# Patient Record
Sex: FEMALE | Race: WHITE | Employment: UNEMPLOYED | URBAN - METROPOLITAN AREA
[De-identification: names, ages, dates, MRNs, and addresses within clinical notes are randomized per-mention and may not be internally consistent; named-entity substitution may affect disease eponyms.]

---

## 2023-01-12 ENCOUNTER — OFFICE VISIT (OUTPATIENT)
Dept: URGENT CARE | Facility: CLINIC | Age: 53
End: 2023-01-12

## 2023-01-12 VITALS
SYSTOLIC BLOOD PRESSURE: 128 MMHG | HEART RATE: 71 BPM | TEMPERATURE: 98.7 F | BODY MASS INDEX: 34.49 KG/M2 | OXYGEN SATURATION: 98 % | WEIGHT: 207 LBS | RESPIRATION RATE: 18 BRPM | HEIGHT: 65 IN | DIASTOLIC BLOOD PRESSURE: 74 MMHG

## 2023-01-12 DIAGNOSIS — J02.9 SORE THROAT: ICD-10-CM

## 2023-01-12 DIAGNOSIS — J02.0 STREP PHARYNGITIS: Primary | ICD-10-CM

## 2023-01-12 LAB — S PYO AG THROAT QL: POSITIVE

## 2023-01-12 RX ORDER — AMOXICILLIN 875 MG/1
875 TABLET, COATED ORAL 2 TIMES DAILY
Qty: 14 TABLET | Refills: 0 | Status: SHIPPED | OUTPATIENT
Start: 2023-01-12 | End: 2023-01-19

## 2023-01-12 RX ORDER — DIPHENOXYLATE HYDROCHLORIDE AND ATROPINE SULFATE 2.5; .025 MG/1; MG/1
1 TABLET ORAL DAILY
COMMUNITY

## 2023-01-12 NOTE — PATIENT INSTRUCTIONS
Strep Throat   WHAT YOU NEED TO KNOW:   Strep throat is a throat infection caused by bacteria  It is easily spread from person to person  DISCHARGE INSTRUCTIONS:   Call 911 for any of the following: You have trouble breathing  Return to the emergency department if:   You have new symptoms like a bad headache, stiff neck, chest pain, or vomiting  You are drooling because you cannot swallow your spit  Contact your healthcare provider if:   You have a fever  You have a rash or ear pain  You have green, yellow-brown, or bloody mucus when you cough or blow your nose  You are unable to drink anything  You have questions or concerns about your condition or care  Medicines:   Antibiotics  help treat your strep throat  You should feel better within 2 to 3 days after you start antibiotics  Take your medicine as directed  Contact your healthcare provider if you think your medicine is not helping or if you have side effects  Tell him or her if you are allergic to any medicine  Keep a list of the medicines, vitamins, and herbs you take  Include the amounts, and when and why you take them  Bring the list or the pill bottles to follow-up visits  Carry your medicine list with you in case of an emergency  Manage your symptoms:   Use lozenges, ice, soft foods, or popsicles  to soothe your throat  Drink juice, milk shakes, or soup  if your throat is too sore to eat solid food  Drinking liquids can also help prevent dehydration  Gargle with salt water  Mix ¼ teaspoon salt in a glass of warm water and gargle  This may help reduce swelling in your throat  Do not smoke  Nicotine and other chemicals in cigarettes and cigars can cause lung damage and make your symptoms worse  Ask your healthcare provider for information if you currently smoke and need help to quit  E-cigarettes or smokeless tobacco still contain nicotine  Talk to your healthcare provider before you use these products      Return to work or school  24 hours after you start antibiotic medicine  Prevent the spread of strep throat:   Wash your hands often  Use soap and water  Wash your hands after you use the bathroom, change a child's diapers, or sneeze  Wash your hands before you prepare or eat food  Do not share food or drinks  Replace your toothbrush after you have taken antibiotics for 24 hours  Follow up with your doctor as directed:  Write down your questions so you remember to ask them during your visits  © Copyright [employer] 2022 Information is for End User's use only and may not be sold, redistributed or otherwise used for commercial purposes  All illustrations and images included in CareNotes® are the copyrighted property of A D [employer] M [employer] Inc  or [employer]   The above information is an  only  It is not intended as medical advice for individual conditions or treatments  Talk to your doctor, nurse or pharmacist before following any medical regimen to see if it is safe and effective for you  Acute Strep Pharyngitis:   -Strep is positive  Will treat with Amoxicillin 875mg taken as prescribed  Take with food and a probiotic    -Warm salt water gargles and tea with honey   -Stay very well hydrated and rest   -Advil or Tylenol for pain or fever  -Eat soft foods   -Cepacol throat lozenges for the pain   -Run a humidifier by your bed  Take steam showers     -If your sx worsen see your PCP immediately     *Change toothbrush after 24 hours after taking antibiotic

## 2023-01-12 NOTE — PROGRESS NOTES
[facility]        NAME: Maykel Orozco is a 46 y o  female  : 1970    MRN: [id number]  DATE: 2023  TIME: 9:02 AM    Assessment and Plan   Sore throat [J02 9]  1  Sore throat  POCT rapid strepA            Patient Instructions   Acute Strep Pharyngitis:   -Strep is positive  Will treat with Amoxicillin 875mg taken as prescribed  Take with food and a probiotic    -Warm salt water gargles and tea with honey   -Stay very well hydrated and rest   -Advil or Tylenol for pain or fever  -Eat soft foods   -Cepacol throat lozenges for the pain   -Run a humidifier by your bed  Take steam showers  -If your sx worsen see your PCP immediately       Follow up with PCP in 3-5 days  Proceed to  ER if symptoms worsen  Chief Complaint     Chief Complaint   Patient presents with   • Cold Like Symptoms     Pt here ill x  24 hours  pt states sore throat, tired,  no fever  Home Covid was neg, Pt is vax x2,   no flu  shot  Pt used Motrin  History of Present Illness       The patient is a 51-year-old female who presents today for a one day hx of sore throat, fatigue  She has tested negative for COVID  She is fully vaccinated  She has been taking Motrin  She has no drooling or stridor  No fever, chills, body aches  No dyspnea, wheezing, chest tightness, cp, palpitations  No dizziness or weakness  No GI sx  Good PO intake  No loss of taste or smell  No lower extremity edema  No hx of asthma or smoking  No known sick contacts or recent travel  Review of Systems   Review of Systems   Constitutional: Positive for fatigue  Negative for activity change, appetite change, chills, diaphoresis and fever  HENT: Positive for sore throat  Negative for congestion, ear discharge, ear pain, facial swelling, hearing loss, postnasal drip, rhinorrhea, sinus pressure, sinus pain, tinnitus, trouble swallowing and voice change  Eyes: Negative for visual disturbance     Respiratory: Negative for apnea, cough, chest tightness, shortness of breath, wheezing and stridor  Cardiovascular: Negative for chest pain, palpitations and leg swelling  Gastrointestinal: Negative for abdominal distention and abdominal pain  Genitourinary: Negative for decreased urine volume  Musculoskeletal: Negative for arthralgias, joint swelling, myalgias, neck pain and neck stiffness  Skin: Negative for rash  Allergic/Immunologic: Negative for immunocompromised state  Neurological: Negative for dizziness, weakness, light-headedness, numbness and headaches  Hematological: Negative for adenopathy  Current Medications       Current Outpatient Medications:   •  multivitamin (THERAGRAN) TABS, Take 1 tablet by mouth daily, Disp: , Rfl:     Current Allergies     Allergies as of 2023   • (No Known Allergies)            The following portions of the patient's history were reviewed and updated as appropriate: allergies, current medications, past family history, past medical history, past social history, past surgical history and problem list      Past Medical History:   Diagnosis Date   • Patient denies medical problems        Past Surgical History:   Procedure Laterality Date   • ADENOIDECTOMY     •  SECTION     • TONSILLECTOMY         Family History   Problem Relation Age of Onset   • Hypertension Mother    • Hyperlipidemia Father          Medications have been verified  Objective   /74   Pulse 71   Temp 98 7 °F (37 1 °C)   Resp 18   Ht 5' 5" (1 651 m)   Wt 93 9 kg (207 lb)   SpO2 98%   BMI 34 45 kg/m²   No LMP recorded  Physical Exam     Physical Exam  Vitals and nursing note reviewed  Constitutional:       General: She is not in acute distress  Appearance: She is well-developed  She is not diaphoretic  HENT:      Head: Normocephalic and atraumatic        Right Ear: Hearing, tympanic membrane, ear canal and external ear normal       Left Ear: Hearing, tympanic membrane, ear canal and external ear normal       Nose: Nose normal  No mucosal edema or rhinorrhea  Right Sinus: No maxillary sinus tenderness or frontal sinus tenderness  Left Sinus: No maxillary sinus tenderness or frontal sinus tenderness  Mouth/Throat:      Pharynx: Uvula midline  Pharyngeal swelling and posterior oropharyngeal erythema present  No oropharyngeal exudate or uvula swelling  Tonsils: No tonsillar exudate or tonsillar abscesses  2+ on the right  2+ on the left  Cardiovascular:      Rate and Rhythm: Normal rate and regular rhythm  Heart sounds: S1 normal and S2 normal  Heart sounds not distant  No murmur heard  No friction rub  No gallop  No S3 or S4 sounds  Pulmonary:      Effort: No tachypnea, bradypnea, accessory muscle usage or respiratory distress  Breath sounds: No decreased breath sounds, wheezing, rhonchi or rales  Musculoskeletal:      Cervical back: Normal range of motion and neck supple  Lymphadenopathy:      Cervical: Cervical adenopathy present  Right cervical: Superficial cervical adenopathy present  Left cervical: Superficial cervical adenopathy present  Neurological:      Mental Status: She is alert and oriented to person, place, and time     Psychiatric:         Behavior: Behavior normal

## 2023-01-19 ENCOUNTER — OFFICE VISIT (OUTPATIENT)
Dept: FAMILY MEDICINE CLINIC | Facility: CLINIC | Age: 53
End: 2023-01-19

## 2023-01-19 ENCOUNTER — TELEPHONE (OUTPATIENT)
Dept: OBGYN CLINIC | Facility: HOSPITAL | Age: 53
End: 2023-01-19

## 2023-01-19 VITALS
SYSTOLIC BLOOD PRESSURE: 120 MMHG | BODY MASS INDEX: 33.27 KG/M2 | HEART RATE: 75 BPM | HEIGHT: 66 IN | OXYGEN SATURATION: 98 % | TEMPERATURE: 97.7 F | RESPIRATION RATE: 16 BRPM | WEIGHT: 207 LBS | DIASTOLIC BLOOD PRESSURE: 80 MMHG

## 2023-01-19 DIAGNOSIS — Z13.220 SCREENING CHOLESTEROL LEVEL: ICD-10-CM

## 2023-01-19 DIAGNOSIS — G89.29 CHRONIC PAIN OF RIGHT WRIST: ICD-10-CM

## 2023-01-19 DIAGNOSIS — Z12.12 ENCOUNTER FOR COLORECTAL CANCER SCREENING: ICD-10-CM

## 2023-01-19 DIAGNOSIS — Z00.00 PHYSICAL EXAM: Primary | ICD-10-CM

## 2023-01-19 DIAGNOSIS — Z12.11 ENCOUNTER FOR COLORECTAL CANCER SCREENING: ICD-10-CM

## 2023-01-19 DIAGNOSIS — Z13.1 SCREENING FOR DIABETES MELLITUS: ICD-10-CM

## 2023-01-19 DIAGNOSIS — Z13.29 SCREENING FOR THYROID DISORDER: ICD-10-CM

## 2023-01-19 DIAGNOSIS — N92.6 MISSED PERIODS: ICD-10-CM

## 2023-01-19 DIAGNOSIS — Z12.31 ENCOUNTER FOR SCREENING MAMMOGRAM FOR BREAST CANCER: ICD-10-CM

## 2023-01-19 DIAGNOSIS — M25.531 CHRONIC PAIN OF RIGHT WRIST: ICD-10-CM

## 2023-01-19 DIAGNOSIS — Z23 ENCOUNTER FOR ADMINISTRATION OF VACCINE: ICD-10-CM

## 2023-01-19 DIAGNOSIS — Z13.0 SCREENING FOR DEFICIENCY ANEMIA: ICD-10-CM

## 2023-01-19 RX ORDER — CALCIUM CARBONATE 500(1250)
500 TABLET ORAL
COMMUNITY

## 2023-01-19 NOTE — TELEPHONE ENCOUNTER
Patient is being referred to a orthopedics  Please schedule accordingly      [facility]   [phone]

## 2023-01-19 NOTE — PROGRESS NOTES
[facility] PRACT[facility]    NAME: Teodora Recio  AGE: 46 y o  SEX: female  : 1970     DATE: 2023     Assessment and Plan:     Problem List Items Addressed This Visit    None  Visit Diagnoses     Physical exam    -  Primary    Encounter for screening mammogram for breast cancer        Relevant Orders    Mammo screening bilateral w 3d & cad    Encounter for colorectal cancer screening        Relevant Orders    Ambulatory referral for colonoscopy    Encounter for administration of vaccine        Relevant Orders    TDAP VACCINE GREATER THAN OR EQUAL TO 8YO IM (Completed)    influenza vaccine, quadrivalent, recombinant, PF, 0 5 mL, for patients 18 yr+ (FLUBLOK) (Completed)    Screening cholesterol level        Relevant Orders    Lipid panel    Screening for diabetes mellitus        Relevant Orders    Comprehensive metabolic panel    Screening for thyroid disorder        Relevant Orders    TSH, 3rd generation with Free T4 reflex    Screening for deficiency anemia        Relevant Orders    CBC and differential    Missed periods        Relevant Orders    FSH and LH    Chronic pain of right wrist        Relevant Orders    Ambulatory Referral to Orthopedic Surgery          Immunizations and preventive care screenings were discussed with patient today  Appropriate education was printed on patient's after visit summary  BMI Counseling: Body mass index is 33 92 kg/m²  The BMI is above normal  Nutrition recommendations include decreasing fast food intake and consuming healthier snacks  Exercise recommendations include exercising 3-5 times per week  Rationale for BMI follow-up plan is due to patient being overweight or obese  Depression Screening and Follow-up Plan: Patient was screened for depression during today's encounter  They screened negative with a PHQ-2 score of 0  No follow-ups on file       Chief Complaint:     Chief Complaint Patient presents with   • Physical Exam     Menopause    • Establish Care      History of Present Illness:     Adult Annual Physical   Patient here for a comprehensive physical exam     patient has been having pain around the wrist   Patient was signs of carpal tunnel  Has not been seen by an orthopedic surgery    Diet and Physical Activity  · Diet/Nutrition: well balanced diet  · Exercise: no formal exercise  Depression Screening  PHQ-2/9 Depression Screening    Little interest or pleasure in doing things: 0 - not at all  Feeling down, depressed, or hopeless: 0 - not at all  PHQ-2 Score: 0  PHQ-2 Interpretation: Negative depression screen       General Health  · Sleep: sleeps well  · Hearing: normal - bilateral   · Vision: no vision problems and wears glasses  · Dental: regular dental visits  /GYN Health  · Patient is: postmenopausal     Review of Systems:     Review of Systems   Constitutional: Negative for activity change, appetite change, chills, fatigue and fever  HENT: Positive for hearing loss  Negative for congestion  Respiratory: Negative for cough, chest tightness and shortness of breath  Cardiovascular: Negative for chest pain and leg swelling  Gastrointestinal: Negative for abdominal distention, abdominal pain, constipation, diarrhea, nausea and vomiting  All other systems reviewed and are negative       Past Medical History:     Past Medical History:   Diagnosis Date   • Patient denies medical problems       Past Surgical History:     Past Surgical History:   Procedure Laterality Date   • ADENOIDECTOMY     •  SECTION     • TONSILLECTOMY        Social History:     Social History     Socioeconomic History   • Marital status: Single     Spouse name: None   • Number of children: None   • Years of education: None   • Highest education level: None   Occupational History   • None   Tobacco Use   • Smoking status: Never   • Smokeless tobacco: Never   Vaping Use   • Vaping Use: Never used   Substance and Sexual Activity   • Alcohol use: Yes     Comment: socially   • Drug use: Never   • Sexual activity: None   Other Topics Concern   • None   Social History Narrative   • None     Social Determinants of Health     Financial Resource Strain: Not on file   Food Insecurity: Not on file   Transportation Needs: Not on file   Physical Activity: Not on file   Stress: Not on file   Social Connections: Not on file   Intimate Partner Violence: Not on file   Housing Stability: Not on file      Family History:     Family History   Problem Relation Age of Onset   • Hypertension Mother    • Heart disease Father    • Hyperlipidemia Father       Current Medications:     Current Outpatient Medications   Medication Sig Dispense Refill   • Calcium 500 MG tablet Take 500 mg by mouth     • multivitamin (THERAGRAN) TABS Take 1 tablet by mouth daily       No current facility-administered medications for this visit  Allergies:     No Known Allergies   Physical Exam:     /80 (BP Location: Left arm, Patient Position: Sitting, Cuff Size: Large)   Pulse 75   Temp 97 7 °F (36 5 °C)   Resp 16   Ht 5' 5 5" (1 664 m)   Wt 93 9 kg (207 lb)   SpO2 98%   BMI 33 92 kg/m²     Physical Exam  Vitals reviewed  Constitutional:       Appearance: Normal appearance  She is well-developed  HENT:      Head: Normocephalic and atraumatic  Right Ear: Tympanic membrane, ear canal and external ear normal  There is no impacted cerumen  Left Ear: Tympanic membrane, ear canal and external ear normal  There is no impacted cerumen  Nose: Nose normal       Mouth/Throat:      Mouth: Mucous membranes are moist       Pharynx: Oropharynx is clear  Eyes:      Conjunctiva/sclera: Conjunctivae normal       Pupils: Pupils are equal, round, and reactive to light  Cardiovascular:      Rate and Rhythm: Normal rate and regular rhythm  Heart sounds: Normal heart sounds     Pulmonary:      Effort: Pulmonary effort is normal       Breath sounds: Normal breath sounds  Abdominal:      General: Abdomen is flat  Bowel sounds are normal       Palpations: Abdomen is soft  Musculoskeletal:         General: Normal range of motion  Cervical back: Normal range of motion and neck supple  Skin:     General: Skin is warm  Capillary Refill: Capillary refill takes less than 2 seconds  Neurological:      General: No focal deficit present  Mental Status: She is alert and oriented to person, place, and time  Mental status is at baseline  Psychiatric:         Mood and Affect: Mood normal          Behavior: Behavior normal          Thought Content:  Thought content normal          Judgment: Judgment normal           Juanpablo Lilly MD  [address]

## 2023-01-28 ENCOUNTER — APPOINTMENT (OUTPATIENT)
Dept: LAB | Facility: HOSPITAL | Age: 53
End: 2023-01-28

## 2023-01-28 DIAGNOSIS — Z13.0 SCREENING FOR DEFICIENCY ANEMIA: ICD-10-CM

## 2023-01-28 DIAGNOSIS — Z13.29 SCREENING FOR THYROID DISORDER: ICD-10-CM

## 2023-01-28 DIAGNOSIS — Z13.220 SCREENING CHOLESTEROL LEVEL: ICD-10-CM

## 2023-01-28 DIAGNOSIS — Z13.1 SCREENING FOR DIABETES MELLITUS: ICD-10-CM

## 2023-01-28 DIAGNOSIS — N92.6 MISSED PERIODS: ICD-10-CM

## 2023-01-28 LAB
ALBUMIN SERPL BCP-MCNC: 3.7 G/DL (ref 3.5–5)
ALP SERPL-CCNC: 72 U/L (ref 46–116)
ALT SERPL W P-5'-P-CCNC: 24 U/L (ref 12–78)
ANION GAP SERPL CALCULATED.3IONS-SCNC: 8 MMOL/L (ref 4–13)
AST SERPL W P-5'-P-CCNC: 22 U/L (ref 5–45)
BASOPHILS # BLD AUTO: 0.05 THOUSANDS/ÂΜL (ref 0–0.1)
BASOPHILS NFR BLD AUTO: 1 % (ref 0–1)
BILIRUB SERPL-MCNC: 0.34 MG/DL (ref 0.2–1)
BUN SERPL-MCNC: 14 MG/DL (ref 5–25)
CALCIUM SERPL-MCNC: 9 MG/DL (ref 8.3–10.1)
CHLORIDE SERPL-SCNC: 105 MMOL/L (ref 96–108)
CHOLEST SERPL-MCNC: 223 MG/DL
CO2 SERPL-SCNC: 28 MMOL/L (ref 21–32)
CREAT SERPL-MCNC: 0.86 MG/DL (ref 0.6–1.3)
EOSINOPHIL # BLD AUTO: 0.58 THOUSAND/ÂΜL (ref 0–0.61)
EOSINOPHIL NFR BLD AUTO: 7 % (ref 0–6)
ERYTHROCYTE [DISTWIDTH] IN BLOOD BY AUTOMATED COUNT: 12.3 % (ref 11.6–15.1)
FSH SERPL-ACNC: 77.8 MIU/ML
GFR SERPL CREATININE-BSD FRML MDRD: 77 ML/MIN/1.73SQ M
GLUCOSE P FAST SERPL-MCNC: 106 MG/DL (ref 65–99)
HCT VFR BLD AUTO: 41.8 % (ref 34.8–46.1)
HDLC SERPL-MCNC: 62 MG/DL
HGB BLD-MCNC: 14 G/DL (ref 11.5–15.4)
IMM GRANULOCYTES # BLD AUTO: 0.02 THOUSAND/UL (ref 0–0.2)
IMM GRANULOCYTES NFR BLD AUTO: 0 % (ref 0–2)
LDLC SERPL CALC-MCNC: 148 MG/DL (ref 0–100)
LH SERPL-ACNC: 23.2 MIU/ML
LYMPHOCYTES # BLD AUTO: 2.34 THOUSANDS/ÂΜL (ref 0.6–4.47)
LYMPHOCYTES NFR BLD AUTO: 30 % (ref 14–44)
MCH RBC QN AUTO: 29.4 PG (ref 26.8–34.3)
MCHC RBC AUTO-ENTMCNC: 33.5 G/DL (ref 31.4–37.4)
MCV RBC AUTO: 88 FL (ref 82–98)
MONOCYTES # BLD AUTO: 0.45 THOUSAND/ÂΜL (ref 0.17–1.22)
MONOCYTES NFR BLD AUTO: 6 % (ref 4–12)
NEUTROPHILS # BLD AUTO: 4.45 THOUSANDS/ÂΜL (ref 1.85–7.62)
NEUTS SEG NFR BLD AUTO: 56 % (ref 43–75)
NONHDLC SERPL-MCNC: 161 MG/DL
NRBC BLD AUTO-RTO: 0 /100 WBCS
PLATELET # BLD AUTO: 250 THOUSANDS/UL (ref 149–390)
PMV BLD AUTO: 10.2 FL (ref 8.9–12.7)
POTASSIUM SERPL-SCNC: 4.4 MMOL/L (ref 3.5–5.3)
PROT SERPL-MCNC: 7.3 G/DL (ref 6.4–8.4)
RBC # BLD AUTO: 4.77 MILLION/UL (ref 3.81–5.12)
SODIUM SERPL-SCNC: 141 MMOL/L (ref 135–147)
TRIGL SERPL-MCNC: 65 MG/DL
TSH SERPL DL<=0.05 MIU/L-ACNC: 0.98 UIU/ML (ref 0.45–4.5)
WBC # BLD AUTO: 7.89 THOUSAND/UL (ref 4.31–10.16)

## 2023-02-02 DIAGNOSIS — E78.5 HYPERLIPIDEMIA, UNSPECIFIED HYPERLIPIDEMIA TYPE: Primary | ICD-10-CM

## 2023-02-02 DIAGNOSIS — R73.09 ABNORMAL GLUCOSE: ICD-10-CM

## 2023-03-01 ENCOUNTER — CONSULT (OUTPATIENT)
Dept: GASTROENTEROLOGY | Facility: CLINIC | Age: 53
End: 2023-03-01

## 2023-03-01 VITALS
HEIGHT: 66 IN | WEIGHT: 204.8 LBS | HEART RATE: 70 BPM | DIASTOLIC BLOOD PRESSURE: 98 MMHG | SYSTOLIC BLOOD PRESSURE: 149 MMHG | BODY MASS INDEX: 32.92 KG/M2

## 2023-03-01 DIAGNOSIS — K21.9 GASTROESOPHAGEAL REFLUX DISEASE, UNSPECIFIED WHETHER ESOPHAGITIS PRESENT: ICD-10-CM

## 2023-03-01 DIAGNOSIS — Z12.11 COLON CANCER SCREENING: ICD-10-CM

## 2023-03-01 DIAGNOSIS — K20.0 EOSINOPHILIC ESOPHAGITIS: Primary | ICD-10-CM

## 2023-03-01 RX ORDER — PANTOPRAZOLE SODIUM 40 MG/1
40 TABLET, DELAYED RELEASE ORAL DAILY
Qty: 90 TABLET | Refills: 1 | Status: SHIPPED | OUTPATIENT
Start: 2023-03-01

## 2023-03-01 RX ORDER — POLYETHYLENE GLYCOL 3350, SODIUM CHLORIDE, SODIUM BICARBONATE, POTASSIUM CHLORIDE 420; 11.2; 5.72; 1.48 G/4L; G/4L; G/4L; G/4L
4000 POWDER, FOR SOLUTION ORAL ONCE
Qty: 4000 ML | Refills: 0 | Status: SHIPPED | OUTPATIENT
Start: 2023-03-01 | End: 2023-03-01

## 2023-03-01 NOTE — PROGRESS NOTES
[facility] [address] [facility] [address] Consultation  Jimmy Rider 46 y o  female MRN: [id number]  Encounter: [id number]          ASSESSMENT AND PLAN:      1  Eosinophilic esophagitis  Diagnosed 10-15 years ago in [place] treated with PPI/Flovent inhaler, now just taking Pepcid AC as needed with heartburn twice a week and dysphagia once a month with steak  Otherwise reports she avoids lentils/chickpeas due to dysphagia with these foods initially  Also reports an allergy to strawberries with hives  -Start pantoprazole 40 mg daily half hour before breakfast  -Avoid food triggers  -EGD scheduled for further evaluation, can perform biopsies of the esophagus at that time  -     EGD; Future; Expected date: 03/01/2023  -     pantoprazole (PROTONIX) 40 mg tablet; Take 1 tablet (40 mg total) by mouth daily 1/2 hour before breakfast    2  Gastroesophageal reflux disease, unspecified whether esophagitis present  Currently taking Pepcid AC as needed for heartburn twice a week  She was on PPI in the past but stopped after 6 months after she was able to lose some weight, but has since gained some weight back after the holidays and moving from [place]  She notices heartburn mostly in the evening after dinner and when she goes out to eat late at night      -Avoid late night meals and laying down within 2 to 3 hours after eating  -Antireflux diet/lifestyle measures discussed  -Start pantoprazole 40 mg daily half hour before breakfast  -EGD scheduled as above  -Ideally patient would like to come off PPI in the future and remain only on Pepcid due to concern for side effects with PPI    -     EGD; Future; Expected date: 03/01/2023  -     pantoprazole (PROTONIX) 40 mg tablet; Take 1 tablet (40 mg total) by mouth daily 1/2 hour before breakfast    3  Colon cancer screening  Patient average risk for colon cancer due for colon cancer screening due to age  She denies any family history of colon cancer  Denies any change in bowel habit, constipation, diarrhea, blood in stool  She has occasional blood with wiping     -Colonoscopy scheduled with EGD  GoLytely prep and procedure explained  -Further recommendations pending colonoscopy  -     Colonoscopy; Future; Expected date: 03/01/2023  -     polyethylene glycol-electrolytes (TriLyte) 4000 mL solution; Take 4,000 mL by mouth once for 1 dose Take 4000 mL by mouth once for 1 dose  Use as directed        ______________________________________________________________________    HPI:  Aureliano Fernandes is a 46 y o  female with history of asthma, EoE, GERD who presents to establish care for EGD/Colonoscopy  She had a history of GERD and dysphagia and was diagnosed with EoE with EGD 10-15 years ago in [place] [address] with Alanna Wells and was originally treated with PPI/Flovent inhaler  She was on the steroid inhaler for a short course and PPI for about 6 months and then switched to [facility] as needed after she lost some weight  She moved from [facility] last April and has since gained some weight  Currently is having heartburn twice a week after dinner with occasional nocturnal awakenings after she goes out to eat  She used to have issues swallowing chickpeas and lentils, so avoids these things and now has noticed issues swallowing steak getting stuck in her esophagus  After this happens, she's not able to drink water to help it pass and she usually gives it time or has to regurgitate it  She's now being careful to chew/cut it up and that has helped  She gets hives with strawberries  She had been to an allergist prior to EoE diagnosis for issues with congestion in her ears and had allergy shots for a while  She normally has issues with dysphagia once a month  She's never had a colonoscopy  Denies any family history of colon cancer  Denies any issues with constipation/diarrhea  She gets occasional blood with wiping   Denies any nausea/vomiting, abdominal pain, black tarry stool, NSAID use  Drinks alcohol socially  Quit tobacco in her 35s after 15 years of smoking  Denies any drug use  REVIEW OF SYSTEMS:    CONSTITUTIONAL: Denies any fever, chills, rigors, and weight loss  HEENT: No earache or tinnitus  CARDIOVASCULAR: No chest pain or palpitations  RESPIRATORY: Denies any cough, hemoptysis, shortness of breath or dyspnea on exertion  GASTROINTESTINAL: As noted in the History of Present Illness  GENITOURINARY: Denies any hematuria or dysuria  NEUROLOGIC: No dizziness or vertigo  MUSCULOSKELETAL: Denies any joint swellings  SKIN: Denies skin rashes or itching  ENDOCRINE: Denies excessive thirst  Denies intolerance to heat or cold  PSYCHOSOCIAL: Denies depression or anxiety  Denies any recent memory loss  Historical Information   Past Medical History:   Diagnosis Date   • Patient denies medical problems      Past Surgical History:   Procedure Laterality Date   • ADENOIDECTOMY     •  SECTION     • TONSILLECTOMY       Social History   Social History     Substance and Sexual Activity   Alcohol Use Yes    Comment: socially     Social History     Substance and Sexual Activity   Drug Use Never     Social History     Tobacco Use   Smoking Status Never   Smokeless Tobacco Never     Family History   Problem Relation Age of Onset   • Hypertension Mother    • Heart disease Father    • Hyperlipidemia Father        Meds/Allergies       Current Outpatient Medications:   •  Calcium 500 MG tablet  •  multivitamin (THERAGRAN) TABS  •  pantoprazole (PROTONIX) 40 mg tablet  •  polyethylene glycol-electrolytes (TriLyte) 4000 mL solution    No Known Allergies        Objective     Blood pressure 149/98, pulse 70, height 5' 5 5" (1 664 m), weight 92 9 kg (204 lb 12 8 oz)  Body mass index is 33 56 kg/m²  PHYSICAL EXAM:      General Appearance:   Alert, cooperative, no distress   HEENT:   Normocephalic, atraumatic, anicteric       Neck:  Supple, symmetrical, trachea midline   Lungs:   Clear to auscultation bilaterally; no rales, rhonchi or wheezing; respirations unlabored    Heart[de-identified]   Regular rate and rhythm; no murmur  Abdomen:   Soft, non-tender, non-distended; normal bowel sounds; no masses, no organomegaly    Genitalia:   Deferred    Rectal:   Deferred    Extremities:  No cyanosis, clubbing or edema    Skin:  No jaundice, rashes, or lesions    Lymph nodes:  No palpable cervical lymphadenopathy        Lab Results:   No visits with results within 1 Day(s) from this visit     Latest known visit with results is:   Appointment on 01/28/2023   Component Date Value   • Cholesterol 01/28/2023 223 (H)    • Triglycerides 01/28/2023 65    • HDL, Direct 01/28/2023 62    • LDL Calculated 01/28/2023 148 (H)    • Non-HDL-Chol (CHOL-HDL) 01/28/2023 161    • Sodium 01/28/2023 141    • Potassium 01/28/2023 4 4    • Chloride 01/28/2023 105    • CO2 01/28/2023 28    • ANION GAP 01/28/2023 8    • BUN 01/28/2023 14    • Creatinine 01/28/2023 0 86    • Glucose, Fasting 01/28/2023 106 (H)    • Calcium 01/28/2023 9 0    • AST 01/28/2023 22    • ALT 01/28/2023 24    • Alkaline Phosphatase 01/28/2023 72    • Total Protein 01/28/2023 7 3    • Albumin 01/28/2023 3 7    • Total Bilirubin 01/28/2023 0 34    • eGFR 01/28/2023 77    • WBC 01/28/2023 7 89    • RBC 01/28/2023 4 77    • Hemoglobin 01/28/2023 14 0    • Hematocrit 01/28/2023 41 8    • MCV 01/28/2023 88    • MCH 01/28/2023 29 4    • MCHC 01/28/2023 33 5    • RDW 01/28/2023 12 3    • MPV 01/28/2023 10 2    • Platelets 16/17/5652 250    • nRBC 01/28/2023 0    • Neutrophils Relative 01/28/2023 56    • Immat GRANS % 01/28/2023 0    • Lymphocytes Relative 01/28/2023 30    • Monocytes Relative 01/28/2023 6    • Eosinophils Relative 01/28/2023 7 (H)    • Basophils Relative 01/28/2023 1    • Neutrophils Absolute 01/28/2023 4 45    • Immature Grans Absolute 01/28/2023 0 02    • Lymphocytes Absolute 01/28/2023 2 34    • Monocytes Absolute 01/28/2023 0 45    • Eosinophils Absolute 01/28/2023 0 58    • Basophils Absolute 01/28/2023 0 05    • TSH 3RD GENERATON 01/28/2023 0 982    • [facility] 01/28/2023 77 8    • LH 01/28/2023 23 2          Radiology Results:   No results found

## 2023-03-01 NOTE — H&P (VIEW-ONLY)
[facility] [address] Consultation  Arianna Graves 46 y o  female MRN: [id number]  Encounter: [id number]          ASSESSMENT AND PLAN:      1  Eosinophilic esophagitis  Diagnosed 10-15 years ago in [place] treated with PPI/Flovent inhaler, now just taking Pepcid AC as needed with heartburn twice a week and dysphagia once a month with steak  Otherwise reports she avoids lentils/chickpeas due to dysphagia with these foods initially  Also reports an allergy to strawberries with hives  -Start pantoprazole 40 mg daily half hour before breakfast  -Avoid food triggers  -EGD scheduled for further evaluation, can perform biopsies of the esophagus at that time  -     EGD; Future; Expected date: 03/01/2023  -     pantoprazole (PROTONIX) 40 mg tablet; Take 1 tablet (40 mg total) by mouth daily 1/2 hour before breakfast    2  Gastroesophageal reflux disease, unspecified whether esophagitis present  Currently taking Pepcid AC as needed for heartburn twice a week  She was on PPI in the past but stopped after 6 months after she was able to lose some weight, but has since gained some weight back after the holidays and moving from [place]  She notices heartburn mostly in the evening after dinner and when she goes out to eat late at night      -Avoid late night meals and laying down within 2 to 3 hours after eating  -Antireflux diet/lifestyle measures discussed  -Start pantoprazole 40 mg daily half hour before breakfast  -EGD scheduled as above  -Ideally patient would like to come off PPI in the future and remain only on Pepcid due to concern for side effects with PPI    -     EGD; Future; Expected date: 03/01/2023  -     pantoprazole (PROTONIX) 40 mg tablet; Take 1 tablet (40 mg total) by mouth daily 1/2 hour before breakfast    3  Colon cancer screening  Patient average risk for colon cancer due for colon cancer screening due to age  She denies any family history of colon cancer  Denies any change in bowel habit, constipation, diarrhea, blood in stool  She has occasional blood with wiping     -Colonoscopy scheduled with EGD  GoLytely prep and procedure explained  -Further recommendations pending colonoscopy  -     Colonoscopy; Future; Expected date: 03/01/2023  -     polyethylene glycol-electrolytes (TriLyte) 4000 mL solution; Take 4,000 mL by mouth once for 1 dose Take 4000 mL by mouth once for 1 dose  Use as directed        ______________________________________________________________________    HPI:  Tosha Dsouza is a 46 y o  female with history of asthma, EoE, GERD who presents to establish care for EGD/Colonoscopy  She had a history of GERD and dysphagia and was diagnosed with EoE with EGD 10-15 years ago in [place] [address] with Dayne Kirk and was originally treated with PPI/Flovent inhaler  She was on the steroid inhaler for a short course and PPI for about 6 months and then switched to [facility] as needed after she lost some weight  She moved from [facility] last April and has since gained some weight  Currently is having heartburn twice a week after dinner with occasional nocturnal awakenings after she goes out to eat  She used to have issues swallowing chickpeas and lentils, so avoids these things and now has noticed issues swallowing steak getting stuck in her esophagus  After this happens, she's not able to drink water to help it pass and she usually gives it time or has to regurgitate it  She's now being careful to chew/cut it up and that has helped  She gets hives with strawberries  She had been to an allergist prior to EoE diagnosis for issues with congestion in her ears and had allergy shots for a while  She normally has issues with dysphagia once a month  She's never had a colonoscopy  Denies any family history of colon cancer  Denies any issues with constipation/diarrhea  She gets occasional blood with wiping   Denies any nausea/vomiting, abdominal pain, black tarry stool, NSAID use  Drinks alcohol socially  Quit tobacco in her 35s after 15 years of smoking  Denies any drug use  REVIEW OF SYSTEMS:    CONSTITUTIONAL: Denies any fever, chills, rigors, and weight loss  HEENT: No earache or tinnitus  CARDIOVASCULAR: No chest pain or palpitations  RESPIRATORY: Denies any cough, hemoptysis, shortness of breath or dyspnea on exertion  GASTROINTESTINAL: As noted in the History of Present Illness  GENITOURINARY: Denies any hematuria or dysuria  NEUROLOGIC: No dizziness or vertigo  MUSCULOSKELETAL: Denies any joint swellings  SKIN: Denies skin rashes or itching  ENDOCRINE: Denies excessive thirst  Denies intolerance to heat or cold  PSYCHOSOCIAL: Denies depression or anxiety  Denies any recent memory loss  Historical Information   Past Medical History:   Diagnosis Date   • Patient denies medical problems      Past Surgical History:   Procedure Laterality Date   • ADENOIDECTOMY     •  SECTION     • TONSILLECTOMY       Social History   Social History     Substance and Sexual Activity   Alcohol Use Yes    Comment: socially     Social History     Substance and Sexual Activity   Drug Use Never     Social History     Tobacco Use   Smoking Status Never   Smokeless Tobacco Never     Family History   Problem Relation Age of Onset   • Hypertension Mother    • Heart disease Father    • Hyperlipidemia Father        Meds/Allergies       Current Outpatient Medications:   •  Calcium 500 MG tablet  •  multivitamin (THERAGRAN) TABS  •  pantoprazole (PROTONIX) 40 mg tablet  •  polyethylene glycol-electrolytes (TriLyte) 4000 mL solution    No Known Allergies        Objective     Blood pressure 149/98, pulse 70, height 5' 5 5" (1 664 m), weight 92 9 kg (204 lb 12 8 oz)  Body mass index is 33 56 kg/m²  PHYSICAL EXAM:      General Appearance:   Alert, cooperative, no distress   HEENT:   Normocephalic, atraumatic, anicteric       Neck:  Supple, symmetrical, trachea midline   Lungs:   Clear to auscultation bilaterally; no rales, rhonchi or wheezing; respirations unlabored    Heart[de-identified]   Regular rate and rhythm; no murmur  Abdomen:   Soft, non-tender, non-distended; normal bowel sounds; no masses, no organomegaly    Genitalia:   Deferred    Rectal:   Deferred    Extremities:  No cyanosis, clubbing or edema    Skin:  No jaundice, rashes, or lesions    Lymph nodes:  No palpable cervical lymphadenopathy        Lab Results:   No visits with results within 1 Day(s) from this visit     Latest known visit with results is:   Appointment on 01/28/2023   Component Date Value   • Cholesterol 01/28/2023 223 (H)    • Triglycerides 01/28/2023 65    • HDL, Direct 01/28/2023 62    • LDL Calculated 01/28/2023 148 (H)    • Non-HDL-Chol (CHOL-HDL) 01/28/2023 161    • Sodium 01/28/2023 141    • Potassium 01/28/2023 4 4    • Chloride 01/28/2023 105    • CO2 01/28/2023 28    • ANION GAP 01/28/2023 8    • BUN 01/28/2023 14    • Creatinine 01/28/2023 0 86    • Glucose, Fasting 01/28/2023 106 (H)    • Calcium 01/28/2023 9 0    • AST 01/28/2023 22    • ALT 01/28/2023 24    • Alkaline Phosphatase 01/28/2023 72    • Total Protein 01/28/2023 7 3    • Albumin 01/28/2023 3 7    • Total Bilirubin 01/28/2023 0 34    • eGFR 01/28/2023 77    • WBC 01/28/2023 7 89    • RBC 01/28/2023 4 77    • Hemoglobin 01/28/2023 14 0    • Hematocrit 01/28/2023 41 8    • MCV 01/28/2023 88    • MCH 01/28/2023 29 4    • MCHC 01/28/2023 33 5    • RDW 01/28/2023 12 3    • MPV 01/28/2023 10 2    • Platelets 60/21/5772 250    • nRBC 01/28/2023 0    • Neutrophils Relative 01/28/2023 56    • Immat GRANS % 01/28/2023 0    • Lymphocytes Relative 01/28/2023 30    • Monocytes Relative 01/28/2023 6    • Eosinophils Relative 01/28/2023 7 (H)    • Basophils Relative 01/28/2023 1    • Neutrophils Absolute 01/28/2023 4 45    • Immature Grans Absolute 01/28/2023 0 02    • Lymphocytes Absolute 01/28/2023 2 34    • Monocytes Absolute 01/28/2023 0 45    • Eosinophils Absolute 01/28/2023 0 58    • Basophils Absolute 01/28/2023 0 05    • TSH 3RD GENERATON 01/28/2023 0 982    • [facility] 01/28/2023 77 8    • LH 01/28/2023 23 2          Radiology Results:   No results found

## 2023-03-01 NOTE — PATIENT INSTRUCTIONS
GERD (Gastroesophageal Reflux Disease)   WHAT YOU NEED TO KNOW:   Gastroesophageal reflux disease (GERD) is reflux that happens more than 2 times a week for a few weeks  Reflux means acid and food in your stomach back up into your esophagus  GERD can cause other health problems over time if it is not treated  DISCHARGE INSTRUCTIONS:   Call your local emergency number (911 in the [address] [facility]) if:   You have severe chest pain and sudden trouble breathing  Return to the emergency department if:   You have trouble breathing after you vomit  You have trouble swallowing, or pain with swallowing  Your bowel movements are black, bloody, or tarry-looking  Your vomit looks like coffee grounds or has blood in it  Call your doctor or gastroenterologist if:   You feel full and cannot burp or vomit  You vomit large amounts, or you vomit often  You are losing weight without trying  Your symptoms get worse or do not improve with treatment  You have questions or concerns about your condition or care  Medicines:   Medicines  are used to decrease stomach acid  Medicine may also be used to help your lower esophageal sphincter and stomach contract (tighten) more  Take your medicine as directed  Contact your healthcare provider if you think your medicine is not helping or if you have side effects  Tell your provider if you are allergic to any medicine  Keep a list of the medicines, vitamins, and herbs you take  Include the amounts, and when and why you take them  Bring the list or the pill bottles to follow-up visits  Carry your medicine list with you in case of an emergency  Manage GERD:       Do not have foods or drinks that may increase heartburn  These include chocolate, peppermint, fried or fatty foods, drinks that contain caffeine, or carbonated drinks (soda)  Other foods include spicy foods, onions, tomatoes, and tomato-based foods   Do not have foods or drinks that can irritate your esophagus, such as citrus fruits, juices, and alcohol  Do not eat large meals  When you eat a lot of food at one time, your stomach needs more acid to digest it  Eat 6 small meals each day instead of 3 large ones, and eat slowly  Do not eat meals 2 to 3 hours before bedtime  Elevate the head of your bed  Place 6-inch blocks under the head of your bed frame  You may also use more than one pillow under your head and shoulders while you sleep  Maintain a healthy weight  If you are overweight, weight loss may help relieve symptoms of GERD  Do not smoke  Smoking weakens the lower esophageal sphincter and increases the risk of GERD  Ask your healthcare provider for information if you currently smoke and need help to quit  E-cigarettes or smokeless tobacco still contain nicotine  Talk to your healthcare provider before you use these products  Do not put pressure on your abdomen  Pressure pushes acid up into your esophagus  Do not wear clothing that is tight around your waist  Do not bend over  Bend at the knees if you need to pick something up  Follow up with your doctor or gastroenterologist as directed:  Write down your questions so you remember to ask them during your visits  © Copyright [employer] 2022 Information is for End User's use only and may not be sold, redistributed or otherwise used for commercial purposes  The above information is an  only  It is not intended as medical advice for individual conditions or treatments  Talk to your doctor, nurse or pharmacist before following any medical regimen to see if it is safe and effective for you

## 2023-03-08 ENCOUNTER — HOSPITAL ENCOUNTER (OUTPATIENT)
Dept: RADIOLOGY | Facility: HOSPITAL | Age: 53
Discharge: HOME | End: 2023-03-08

## 2023-03-08 VITALS — BODY MASS INDEX: 32.78 KG/M2 | HEIGHT: 66 IN | WEIGHT: 204 LBS

## 2023-03-08 DIAGNOSIS — Z12.31 ENCOUNTER FOR SCREENING MAMMOGRAM FOR BREAST CANCER: ICD-10-CM

## 2023-03-09 ENCOUNTER — TELEPHONE (OUTPATIENT)
Dept: GASTROENTEROLOGY | Facility: CLINIC | Age: 53
End: 2023-03-09

## 2023-03-09 NOTE — TELEPHONE ENCOUNTER
lmom confirming pt's colonoscopy/egd scheduled on 3/15/23 at [place] [address] with Dr Ana Sellers   Informed [place] [address] would be calling the day prior with the arrival time  Pt has instructions and did not have any questions  I did make her aware that the authorization is still pending and if any problems we would notify her

## 2023-03-14 RX ORDER — SODIUM CHLORIDE, SODIUM LACTATE, POTASSIUM CHLORIDE, CALCIUM CHLORIDE 600; 310; 30; 20 MG/100ML; MG/100ML; MG/100ML; MG/100ML
75 INJECTION, SOLUTION INTRAVENOUS CONTINUOUS
Status: CANCELLED | OUTPATIENT
Start: 2023-03-14

## 2023-03-15 ENCOUNTER — ANESTHESIA (OUTPATIENT)
Dept: GASTROENTEROLOGY | Facility: AMBULARY SURGERY CENTER | Age: 53
End: 2023-03-15

## 2023-03-15 ENCOUNTER — HOSPITAL ENCOUNTER (OUTPATIENT)
Dept: GASTROENTEROLOGY | Facility: AMBULARY SURGERY CENTER | Age: 53
Setting detail: OUTPATIENT SURGERY
Discharge: HOME | End: 2023-03-15
Attending: INTERNAL MEDICINE

## 2023-03-15 ENCOUNTER — ANESTHESIA EVENT (OUTPATIENT)
Dept: GASTROENTEROLOGY | Facility: AMBULARY SURGERY CENTER | Age: 53
End: 2023-03-15

## 2023-03-15 VITALS
RESPIRATION RATE: 18 BRPM | DIASTOLIC BLOOD PRESSURE: 57 MMHG | HEART RATE: 67 BPM | OXYGEN SATURATION: 98 % | SYSTOLIC BLOOD PRESSURE: 102 MMHG

## 2023-03-15 DIAGNOSIS — K21.9 GASTROESOPHAGEAL REFLUX DISEASE, UNSPECIFIED WHETHER ESOPHAGITIS PRESENT: ICD-10-CM

## 2023-03-15 DIAGNOSIS — Z12.11 COLON CANCER SCREENING: ICD-10-CM

## 2023-03-15 DIAGNOSIS — K20.0 EOSINOPHILIC ESOPHAGITIS: ICD-10-CM

## 2023-03-15 PROBLEM — E66.811 OBESITY (BMI 30.0-34.9): Status: ACTIVE | Noted: 2023-03-15

## 2023-03-15 PROBLEM — E66.9 OBESITY (BMI 30.0-34.9): Status: ACTIVE | Noted: 2023-03-15

## 2023-03-15 LAB
EXT PREGNANCY TEST URINE: NEGATIVE
EXT. CONTROL: NORMAL

## 2023-03-15 RX ORDER — PROPOFOL 10 MG/ML
INJECTION, EMULSION INTRAVENOUS AS NEEDED
Status: DISCONTINUED | OUTPATIENT
Start: 2023-03-15 | End: 2023-03-15

## 2023-03-15 RX ORDER — SODIUM CHLORIDE, SODIUM LACTATE, POTASSIUM CHLORIDE, CALCIUM CHLORIDE 600; 310; 30; 20 MG/100ML; MG/100ML; MG/100ML; MG/100ML
INJECTION, SOLUTION INTRAVENOUS CONTINUOUS PRN
Status: DISCONTINUED | OUTPATIENT
Start: 2023-03-15 | End: 2023-03-15

## 2023-03-15 RX ORDER — SODIUM CHLORIDE, SODIUM LACTATE, POTASSIUM CHLORIDE, CALCIUM CHLORIDE 600; 310; 30; 20 MG/100ML; MG/100ML; MG/100ML; MG/100ML
75 INJECTION, SOLUTION INTRAVENOUS CONTINUOUS
Status: DISCONTINUED | OUTPATIENT
Start: 2023-03-15 | End: 2023-03-19 | Stop reason: HOSPADM

## 2023-03-15 RX ORDER — PROPOFOL 10 MG/ML
INJECTION, EMULSION INTRAVENOUS CONTINUOUS PRN
Status: DISCONTINUED | OUTPATIENT
Start: 2023-03-15 | End: 2023-03-15

## 2023-03-15 RX ORDER — LIDOCAINE HYDROCHLORIDE 20 MG/ML
INJECTION, SOLUTION EPIDURAL; INFILTRATION; INTRACAUDAL; PERINEURAL AS NEEDED
Status: DISCONTINUED | OUTPATIENT
Start: 2023-03-15 | End: 2023-03-15

## 2023-03-15 RX ORDER — ONDANSETRON 2 MG/ML
INJECTION INTRAMUSCULAR; INTRAVENOUS AS NEEDED
Status: DISCONTINUED | OUTPATIENT
Start: 2023-03-15 | End: 2023-03-15

## 2023-03-15 RX ADMIN — SODIUM CHLORIDE, SODIUM LACTATE, POTASSIUM CHLORIDE, AND CALCIUM CHLORIDE: .6; .31; .03; .02 INJECTION, SOLUTION INTRAVENOUS at 10:11

## 2023-03-15 RX ADMIN — LIDOCAINE HYDROCHLORIDE 100 MG: 20 INJECTION, SOLUTION EPIDURAL; INFILTRATION; INTRACAUDAL; PERINEURAL at 10:46

## 2023-03-15 RX ADMIN — PROPOFOL 150 MG: 10 INJECTION, EMULSION INTRAVENOUS at 10:46

## 2023-03-15 RX ADMIN — ONDANSETRON 4 MG: 2 INJECTION INTRAMUSCULAR; INTRAVENOUS at 10:45

## 2023-03-15 RX ADMIN — PROPOFOL 130 MCG/KG/MIN: 10 INJECTION, EMULSION INTRAVENOUS at 10:46

## 2023-03-15 NOTE — ANESTHESIA POSTPROCEDURE EVALUATION
Post-Op Assessment Note    CV Status:  Stable    Pain management: adequate     Mental Status:  Alert and awake   Hydration Status:  Stable   PONV Controlled:  Controlled   Airway Patency:  Patent and adequate      Post Op Vitals Reviewed: Yes      Staff: CRNA         No notable events documented      BP      Temp      Pulse     Resp      SpO2

## 2023-03-15 NOTE — ANESTHESIA PREPROCEDURE EVALUATION
Procedure:  COLONOSCOPY  EGD    Relevant Problems   ANESTHESIA   (+) PONV (postoperative nausea and vomiting)      CARDIO (within normal limits)      ENDO (within normal limits)      GI/HEPATIC   (+) Gastroesophageal reflux disease      /RENAL (within normal limits)      GYN   (-) Currently pregnant      HEMATOLOGY (within normal limits)      MUSCULOSKELETAL (within normal limits)      NEURO/PSYCH (within normal limits)      PULMONARY (within normal limits)      Digestive   (+) Eosinophilic esophagitis      Other   (+) Obesity (BMI 30 0-34  9)             Anesthesia Plan  ASA Score- 2     Anesthesia Type- IV sedation with anesthesia with ASA Monitors  Additional Monitors:   Airway Plan:           Plan Factors-Exercise tolerance (METS): >4 METS  Chart reviewed  Imaging results reviewed  Existing labs reviewed  Patient summary reviewed  Patient is not a current smoker  Patient did not smoke on day of surgery  Induction- intravenous  Postoperative Plan-     Informed Consent- Anesthetic plan and risks discussed with patient  I personally reviewed this patient with the CRNA  Discussed and agreed on the Anesthesia Plan with the CRNA         NPO verified  NKDA  Urine pregnancy test negative today, 3/15/23  Plan:  IV sedation/MAC, GA as backup    Benefits and risks of sedation were discussed with the patient including possibility of recall under sedation and the potential for conversion to general anesthesia if necessary  All questions were answered  Anesthesia consent was obtained from the patient

## 2023-03-20 ENCOUNTER — OFFICE VISIT (OUTPATIENT)
Dept: OBGYN CLINIC | Facility: CLINIC | Age: 53
End: 2023-03-20

## 2023-03-20 ENCOUNTER — NURSE TRIAGE (OUTPATIENT)
Dept: OTHER | Facility: OTHER | Age: 53
End: 2023-03-20

## 2023-03-20 VITALS
SYSTOLIC BLOOD PRESSURE: 120 MMHG | TEMPERATURE: 97.9 F | BODY MASS INDEX: 32.47 KG/M2 | HEART RATE: 56 BPM | WEIGHT: 202 LBS | DIASTOLIC BLOOD PRESSURE: 82 MMHG | HEIGHT: 66 IN

## 2023-03-20 DIAGNOSIS — M77.8 RIGHT WRIST TENDONITIS: Primary | ICD-10-CM

## 2023-03-20 NOTE — PROGRESS NOTES
Chief Complaint     Right wrist pain       History of Present Illness     Cristina Rivero is a 46 y o  right hand dominant female presents for initial evaluation of her right wrist  She notes mild pain and tingling in the lateral aspect of her right wrist  She denies any injury or trauma, however her symptoms started around thanks giving  Pain is located along the dorsal ulnar aspect of the wrist and occurs with certain movements  The pain has been intermittent  Currently, she does not have pain  Past Medical History:   Diagnosis Date   • Patient denies medical problems    • PONV (postoperative nausea and vomiting)        Past Surgical History:   Procedure Laterality Date   • ADENOIDECTOMY     •  SECTION      x1   • TONSILLECTOMY         No Known Allergies    Current Outpatient Medications on File Prior to Visit   Medication Sig Dispense Refill   • Calcium 500 MG tablet Take 500 mg by mouth     • multivitamin (THERAGRAN) TABS Take 1 tablet by mouth daily     • pantoprazole (PROTONIX) 40 mg tablet Take 1 tablet (40 mg total) by mouth daily 1/2 hour before breakfast 90 tablet 1     No current facility-administered medications on file prior to visit  Social History     Tobacco Use   • Smoking status: Never   • Smokeless tobacco: Never   Vaping Use   • Vaping Use: Never used   Substance Use Topics   • Alcohol use: Yes     Comment: socially   • Drug use: Never       Family History   Problem Relation Age of Onset   • Breast cancer Mother 76   • Hypertension Mother    • Melanoma Mother 79   • Heart disease Father    • Hyperlipidemia Father    • Prostate cancer Father 72       Review of Systems     As stated in the HPI  All other systems were reviewed and are negative  Physical Exam     /82   Pulse 56   Temp 97 9 °F (36 6 °C)   Ht 5' 5 5" (1 664 m)   Wt 91 6 kg (202 lb)   BMI 33 10 kg/m²     GENERAL: This is a well-developed, well-nourished, age-appropriate patient in no acute distress  The patient is alert and oriented x3  Pleasant and cooperative  Eyes: Anicteric sclerae  Extraocular movements appear intact  HENT: Nares are patent with no drainage  Lungs: There is equal chest rise on inspection  Breathing is non-labored with no audible wheezing  Cardiovascular: No cyanosis  No upper extremity lymphadema  Skin: Skin is warm to touch  No obvious skin lesions or rashes other than described below  Neurologic: No ataxia  Psychiatric: Mood and affect are appropriate  Right Hand Exam     Range of Motion   The patient has normal right wrist ROM  Muscle Strength   The patient has normal right wrist strength  Wrist extension: 5/5   Wrist flexion: 5/5   : 5/5     Other   Erythema: absent  Scars: absent  Sensation: normal  Pulse: present    Comments:  5/5 radial and ulnar deviation   Full ROM pronation and supination  DRUJ stable   No TTP ECU tendon  No TFCC TTP          Left Hand Exam     Range of Motion   The patient has normal left wrist ROM  Muscle Strength   The patient has normal left wrist strength  Wrist extension: 5/5   Wrist flexion: 5/5   :  5/5                Data Review     Labs:  none    Electrodiagnostic Testing:  none    Imaging:  None     Assessment and Plan      Diagnoses and all orders for this visit:    Right wrist tendonitis       Wash July is a 46 y o  female who presents with right wrist ECU tendonitis  · Discussed that treatment options at this time include activity modification, bracing, OTC analgesics, occupational therapy, and possible injection therapy     · I recommend she complete activity modification and bracing as her symptoms as mild at this time       Follow Up: PRN     To Do Next Visit: repeat evaluation     PROCEDURES PERFORMED:  Procedures  No Procedures performed today       Scribe Attestation    I,:   am acting as a scribe while in the presence of the attending physician :       I,:   personally performed the services described in this documentation    as scribed in my presence :

## 2023-03-20 NOTE — TELEPHONE ENCOUNTER
Regarding: post procedure - diarrhea, tired, fatigue, weakness  ----- Message from Bishnu Quezada sent at 3/20/2023 12:20 PM EDT -----  " I had a colonoscopy on Wednesday   I have been having diarrhea since Friday, I am very tired, weak and fatigued "

## 2023-03-20 NOTE — TELEPHONE ENCOUNTER
Pt had colonoscopy on 3/15  Over the weekend pt had been having multiple episodes of diarrhea and vomiting  Pt reports she has not thrown up since yesterday morning and diarrhea seems to be improving  Pt states over the weekend she was feeling weak and fatigued - but is feeling better today  Advised patient to drink electrolyte based drinks and to call back if anything changes or worsens  Reason for Disposition  • MILD-MODERATE diarrhea (e g , 1-6 times / day more than normal)    Answer Assessment - Initial Assessment Questions  1  SYMPTOM: "What's the main symptom you're concerned about?" (e g , pain, fever, vomiting)      Diarrhea and vomitting  2  ONSET: "When did   start?"     friday  3  SURGERY: "What surgery was performed?"     colonoscopy  4  DATE of SURGERY: "When was surgery performed?"       3/15/23  5  ANESTHESIA: " What type of anesthesia did you have?" (e g , general, spinal, epidural, local)     general  6  PAIN: "Is there any pain?" If Yes, ask: "How bad is it?"  (Scale 1-10; or mild, moderate, severe)     denies  7  FEVER: "Do you have a fever?" If Yes, ask: "What is your temperature, how was it measured, and when did it start?"      denies  8  VOMITING: "Is there any vomiting?" If yes, ask: "How many times?"     denies  9  BLEEDING: "Is there any bleeding?" If Yes, ask: "How much?" and "Where?"      denies  10   OTHER SYMPTOMS: "Do you have any other symptoms?" (e g , drainage from wound, painful urination, constipation)        denies    Protocols used: DIARRHEA-ADULT-OH, POST-OP SYMPTOMS AND QUESTIONS-ADULT-OH

## 2023-07-13 ENCOUNTER — TELEPHONE (OUTPATIENT)
Dept: GASTROENTEROLOGY | Facility: CLINIC | Age: 53
End: 2023-07-13

## 2023-07-13 ENCOUNTER — OFFICE VISIT (OUTPATIENT)
Dept: GASTROENTEROLOGY | Facility: CLINIC | Age: 53
End: 2023-07-13
Payer: COMMERCIAL

## 2023-07-13 VITALS
SYSTOLIC BLOOD PRESSURE: 161 MMHG | HEIGHT: 66 IN | HEART RATE: 59 BPM | WEIGHT: 208.8 LBS | BODY MASS INDEX: 33.56 KG/M2 | DIASTOLIC BLOOD PRESSURE: 86 MMHG

## 2023-07-13 DIAGNOSIS — K20.0 EOSINOPHILIC ESOPHAGITIS: Primary | ICD-10-CM

## 2023-07-13 DIAGNOSIS — K21.9 GASTROESOPHAGEAL REFLUX DISEASE, UNSPECIFIED WHETHER ESOPHAGITIS PRESENT: ICD-10-CM

## 2023-07-13 PROCEDURE — 99213 OFFICE O/P EST LOW 20 MIN: CPT | Performed by: INTERNAL MEDICINE

## 2023-07-13 RX ORDER — PANTOPRAZOLE SODIUM 40 MG/1
40 TABLET, DELAYED RELEASE ORAL DAILY
Qty: 90 TABLET | Refills: 1 | Status: SHIPPED | OUTPATIENT
Start: 2023-07-13

## 2023-07-13 NOTE — PROGRESS NOTES
[facility] - Outpatient Follow-up Note  Krishan Roy 48 y.o. female MRN: [id number]  Encounter: [id number]          ASSESSMENT AND PLAN:      1. Eosinophilic esophagitis  2. Gastroesophageal reflux disease, unspecified whether esophagitis present  Continue pantoprazole 40 mg daily. We will plan repeat EGD in a few months for reevaluation to determine if eosinophilia has improved. Symptoms currently well controlled. Contingency might include treatment with Dupixent    - EGD; Future  - pantoprazole (PROTONIX) 40 mg tablet; Take 1 tablet (40 mg total) by mouth daily 1/2 hour before breakfast  Dispense: 90 tablet; Refill: 1    3. Colorectal cancer screening  Had a few small hyperplastic polyps on colonoscopy. Repeat in     ______________________________________________________________________    SUBJECTIVE: Longstanding history of eosinophilic esophagitis previously on famotidine as needed with persistent episodic dysphagia now on pantoprazole 40 mg daily with resolution of symptoms. EGD in mid March revealed greater than 50 eosinophils per high-power field throughout the entire esophagus. REVIEW OF SYSTEMS:    Review of Systems   Gastrointestinal: Positive for abdominal pain.           Historical Information   Past Medical History:   Diagnosis Date   • Patient denies medical problems    • PONV (postoperative nausea and vomiting)      Past Surgical History:   Procedure Laterality Date   • ADENOIDECTOMY     •  SECTION      x1   • TONSILLECTOMY       Social History   Social History     Substance and Sexual Activity   Alcohol Use Yes    Comment: socially     Social History     Substance and Sexual Activity   Drug Use Never     Social History     Tobacco Use   Smoking Status Never   Smokeless Tobacco Never     Family History   Problem Relation Age of Onset   • Breast cancer Mother 76   • Hypertension Mother    • Melanoma Mother 79   • Heart disease Father    • Hyperlipidemia Father    • Prostate cancer Father 72       Meds/Allergies       Current Outpatient Medications:   •  Calcium 500 MG tablet  •  multivitamin (THERAGRAN) TABS  •  pantoprazole (PROTONIX) 40 mg tablet    Allergies   Allergen Reactions   • Strawberry Extract - Food Allergy Other (See Comments)           Objective     Blood pressure 161/86, pulse 59, height 5' 5.5" (1.664 m), weight 94.7 kg (208 lb 12.8 oz). Body mass index is 34.22 kg/m². PHYSICAL EXAM:      Physical Exam     Lab Results:   No visits with results within 1 Day(s) from this visit. Latest known visit with results is:   Hospital Outpatient Visit on 03/15/2023   Component Date Value   • EXT Preg Test, Ur 03/15/2023 Negative    • Control 03/15/2023 Valid    • Case Report 03/15/2023                      Value:Surgical Pathology Report                         Case: [id number]                                   Authorizing Provider:  Rosetta Lynch MD         Collected:           03/15/2023 1050              Ordering Location:     [facility]   Received:            03/15/2023 [address]                                                                       Pathologist:           Timmy Wen MD                                                          Specimens:   A) - Esophagus, Distal Esophagus Bx - Hx EOE                                                        B) - Esophagus, Mid Esophagus Bx - Hx EOE                                                           C) - Esophagus, Proximal Esophagus Bx - Hx EOE                                                      D) - Large Intestine, Right/Ascending Colon, Ascending Colon Polyp Bx                               E) - Rectum, Rectal Polyp Bx                                                              • Final Diagnosis 03/15/2023                      Value: This result contains rich text formatting which cannot be displayed here.    • Additional Information 03/15/2023                      Value: This result contains rich text formatting which cannot be displayed here. • Synoptic Checklist 03/15/2023                      Value:                            COLON/RECTUM POLYP FORM - GI - All Specimens                                                                                     :    Other     • Gross Description 03/15/2023                      Value: This result contains rich text formatting which cannot be displayed here. Radiology Results:   No results found.

## 2023-07-13 NOTE — TELEPHONE ENCOUNTER
Pt was seen in office w/Dr. Roslyn Stahl 7/13. Needs to be scheduled for EGD at [facility] in October with a f/u ov with him. Will call pt once October schedule is released.

## 2023-08-31 ENCOUNTER — OFFICE VISIT (OUTPATIENT)
Dept: FAMILY MEDICINE CLINIC | Facility: CLINIC | Age: 53
End: 2023-08-31
Payer: COMMERCIAL

## 2023-08-31 VITALS
TEMPERATURE: 97.6 F | RESPIRATION RATE: 14 BRPM | HEIGHT: 66 IN | DIASTOLIC BLOOD PRESSURE: 92 MMHG | BODY MASS INDEX: 33.59 KG/M2 | HEART RATE: 62 BPM | SYSTOLIC BLOOD PRESSURE: 148 MMHG | WEIGHT: 209 LBS | OXYGEN SATURATION: 98 %

## 2023-08-31 DIAGNOSIS — E78.5 HYPERLIPIDEMIA, UNSPECIFIED HYPERLIPIDEMIA TYPE: ICD-10-CM

## 2023-08-31 DIAGNOSIS — I10 PRIMARY HYPERTENSION: Primary | ICD-10-CM

## 2023-08-31 PROCEDURE — 99214 OFFICE O/P EST MOD 30 MIN: CPT | Performed by: FAMILY MEDICINE

## 2023-08-31 RX ORDER — LOSARTAN POTASSIUM 25 MG/1
25 TABLET ORAL DAILY
Qty: 30 TABLET | Refills: 0 | Status: SHIPPED | OUTPATIENT
Start: 2023-08-31

## 2023-08-31 NOTE — PROGRESS NOTES
Sonya Jeff 1970 female MRN: [id number]    [address]      ASSESSMENT/PLAN  Sonya Jeff is a 48 y.o. female presents to the office for    Diagnoses and all orders for this visit:    Primary hypertension  -     losartan (COZAAR) 25 mg tablet; Take 1 tablet (25 mg total) by mouth daily    Hyperlipidemia, unspecified hyperlipidemia type    Other orders  -     Mag Oxide-Vit D3-Turmeric (MAGNESIUM-VITAMIN D3-TURMERIC PO)       Losartan 25 mg started today. Side effects discussed with the patient. Continue blood pressure log. We will perform an EKG/have her see cardiology if unable to control              Future Appointments   Date Time Provider [address]   9/21/2023  4:45 PM Hernandez Lord MD [facility]   10/2/2023  9:00 AM Jud Greenberg MD [facility]   11/9/2023  2:40 PM Jud Greenberg MD [facility]          SUBJECTIVE  CC: Blood Pressure Check (Pt states elevated BP today 184/107 with headache)      HPI:  Sonya Jeff is a 48 y.o. female who presents for an acute appointment. Patient states she she has been having elevated blood pressure. States that she also had it when she had her endoscopy/seeing the GI doctor. Patient states that she is now having some head pressure with this. Has no history of elevated blood pressure in the past  Review of Systems   Constitutional: Negative for activity change, appetite change, chills, fatigue and fever. HENT: Negative for congestion. Respiratory: Negative for cough, chest tightness and shortness of breath. Cardiovascular: Negative for chest pain and leg swelling. Gastrointestinal: Negative for abdominal distention, abdominal pain, constipation, diarrhea, nausea and vomiting. Neurological: Positive for headaches. All other systems reviewed and are negative.       Historical Information   The patient history was reviewed as follows:  Past Medical History:   Diagnosis Date   • Patient denies medical problems    • PONV (postoperative nausea and vomiting)          Medications:     Current Outpatient Medications:   •  Calcium 500 MG tablet, Take 500 mg by mouth, Disp: , Rfl:   •  losartan (COZAAR) 25 mg tablet, Take 1 tablet (25 mg total) by mouth daily, Disp: 30 tablet, Rfl: 0  •  Mag Oxide-Vit D3-Turmeric (MAGNESIUM-VITAMIN D3-TURMERIC PO), , Disp: , Rfl:   •  multivitamin (THERAGRAN) TABS, Take 1 tablet by mouth daily, Disp: , Rfl:   •  pantoprazole (PROTONIX) 40 mg tablet, Take 1 tablet (40 mg total) by mouth daily 1/2 hour before breakfast, Disp: 90 tablet, Rfl: 1    Allergies   Allergen Reactions   • Strawberry Extract - Food Allergy Other (See Comments)       OBJECTIVE  Vitals:   Vitals:    08/31/23 1818   BP: 148/92   BP Location: Left arm   Patient Position: Sitting   Cuff Size: Adult   Pulse: 62   Resp: 14   Temp: 97.6 °F (36.4 °C)   TempSrc: Temporal   SpO2: 98%   Weight: 94.8 kg (209 lb)   Height: 5' 5.5" (1.664 m)         Physical Exam  Vitals reviewed. Constitutional:       Appearance: She is well-developed. HENT:      Head: Normocephalic and atraumatic. Eyes:      Conjunctiva/sclera: Conjunctivae normal.      Pupils: Pupils are equal, round, and reactive to light. Cardiovascular:      Rate and Rhythm: Normal rate and regular rhythm. Heart sounds: Normal heart sounds. Pulmonary:      Effort: Pulmonary effort is normal. No respiratory distress. Breath sounds: Normal breath sounds. Musculoskeletal:         General: Normal range of motion. Cervical back: Normal range of motion and neck supple. Skin:     General: Skin is warm. Capillary Refill: Capillary refill takes less than 2 seconds. Neurological:      Mental Status: She is alert and oriented to person, place, and time.                     Alois Shone, MD,   [facility]  9/2/2023

## 2023-09-16 ENCOUNTER — APPOINTMENT (OUTPATIENT)
Dept: LAB | Facility: HOSPITAL | Age: 53
End: 2023-09-16
Payer: COMMERCIAL

## 2023-09-16 DIAGNOSIS — E78.5 HYPERLIPIDEMIA, UNSPECIFIED HYPERLIPIDEMIA TYPE: ICD-10-CM

## 2023-09-16 DIAGNOSIS — R73.09 ABNORMAL GLUCOSE: ICD-10-CM

## 2023-09-16 LAB
ANION GAP SERPL CALCULATED.3IONS-SCNC: 5 MMOL/L
BUN SERPL-MCNC: 18 MG/DL (ref 5–25)
CALCIUM SERPL-MCNC: 8.7 MG/DL (ref 8.4–10.2)
CHLORIDE SERPL-SCNC: 106 MMOL/L (ref 96–108)
CHOLEST SERPL-MCNC: 230 MG/DL
CO2 SERPL-SCNC: 28 MMOL/L (ref 21–32)
CREAT SERPL-MCNC: 0.74 MG/DL (ref 0.6–1.3)
GFR SERPL CREATININE-BSD FRML MDRD: 92 ML/MIN/1.73SQ M
GLUCOSE P FAST SERPL-MCNC: 103 MG/DL (ref 65–99)
HDLC SERPL-MCNC: 53 MG/DL
LDLC SERPL CALC-MCNC: 150 MG/DL (ref 0–100)
NONHDLC SERPL-MCNC: 177 MG/DL
POTASSIUM SERPL-SCNC: 4.4 MMOL/L (ref 3.5–5.3)
SODIUM SERPL-SCNC: 139 MMOL/L (ref 135–147)
TRIGL SERPL-MCNC: 133 MG/DL

## 2023-09-16 PROCEDURE — 36415 COLL VENOUS BLD VENIPUNCTURE: CPT

## 2023-09-16 PROCEDURE — 80061 LIPID PANEL: CPT

## 2023-09-16 PROCEDURE — 80048 BASIC METABOLIC PNL TOTAL CA: CPT

## 2023-09-21 ENCOUNTER — OFFICE VISIT (OUTPATIENT)
Dept: FAMILY MEDICINE CLINIC | Facility: CLINIC | Age: 53
End: 2023-09-21
Payer: COMMERCIAL

## 2023-09-21 VITALS
BODY MASS INDEX: 34.07 KG/M2 | SYSTOLIC BLOOD PRESSURE: 130 MMHG | DIASTOLIC BLOOD PRESSURE: 90 MMHG | TEMPERATURE: 97.2 F | OXYGEN SATURATION: 97 % | HEART RATE: 67 BPM | WEIGHT: 212 LBS | HEIGHT: 66 IN

## 2023-09-21 DIAGNOSIS — E78.5 HYPERLIPIDEMIA, UNSPECIFIED HYPERLIPIDEMIA TYPE: ICD-10-CM

## 2023-09-21 DIAGNOSIS — I10 PRIMARY HYPERTENSION: Primary | ICD-10-CM

## 2023-09-21 DIAGNOSIS — Z12.31 SCREENING MAMMOGRAM FOR BREAST CANCER: ICD-10-CM

## 2023-09-21 DIAGNOSIS — Z23 ENCOUNTER FOR ADMINISTRATION OF VACCINE: ICD-10-CM

## 2023-09-21 PROCEDURE — 90471 IMMUNIZATION ADMIN: CPT | Performed by: FAMILY MEDICINE

## 2023-09-21 PROCEDURE — 99214 OFFICE O/P EST MOD 30 MIN: CPT | Performed by: FAMILY MEDICINE

## 2023-09-21 PROCEDURE — 90686 IIV4 VACC NO PRSV 0.5 ML IM: CPT | Performed by: FAMILY MEDICINE

## 2023-09-21 NOTE — PROGRESS NOTES
Yoli Elkins 1970 female MRN: [id number]    FAMILY PRACTICE OFFICE VISIT  [facility] - [address] [place] [address] [place]      ASSESSMENT/PLAN  Yoli Elkins is a 48 y.o. female presents to the office for    Diagnoses and all orders for this visit:    Primary hypertension    Encounter for administration of vaccine  -     influenza vaccine, quadrivalent, 0.5 mL, preservative-free, for adult and pediatric patients 6 mos+ (AFLURIA, FLUARIX, FLULAVAL, FLUZONE)    Hyperlipidemia, unspecified hyperlipidemia type    Screening mammogram for breast cancer  -     Mammo screening bilateral w 3d & cad; Future       Increase losartan to 25mgs BID; encouraged the patient to contact this for the next refill given that I do not know if she might be overtreated with this dosage. If she tolerates that then we will send in a refill. Hyperlipidemia at this time education given we will continue to monitor and adjust lifestyle modifications         Future Appointments   Date Time Provider [address]   10/2/2023  9:00 AM Robin Arenas MD [facility] [address]   11/9/2023  2:40 PM Robin Arenas MD GI  Practice-Med   3/28/2024  6:00 PM Juan José Cormier MD [facility]          SUBJECTIVE  CC: Blood Pressure Check      HPI:  Yoli Elkins is a 48 y.o. female who presents for a follow-up appointment on blood work. Patient states that overall she has been doing okay. Patient states that she would like to try naturally to lower her cholesterol without any medications. Patient also states that her blood pressure is normally on the lower range and is that she is due for her mammogram.  Review of Systems   Constitutional: Negative for activity change, appetite change, chills, fatigue and fever. HENT: Negative for congestion. Respiratory: Negative for cough, chest tightness and shortness of breath. Cardiovascular: Negative for chest pain and leg swelling.    Gastrointestinal: Negative for abdominal distention, abdominal pain, constipation, diarrhea, nausea and vomiting. All other systems reviewed and are negative. Historical Information   The patient history was reviewed as follows:  Past Medical History:   Diagnosis Date   • Allergic     seasonal   • Asthma     very rarely   • GERD (gastroesophageal reflux disease) 10+ years ago   • Obesity    • Patient denies medical problems    • PONV (postoperative nausea and vomiting)          Medications:     Current Outpatient Medications:   •  Calcium 500 MG tablet, Take 500 mg by mouth, Disp: , Rfl:   •  losartan (COZAAR) 25 mg tablet, Take 1 tablet (25 mg total) by mouth daily, Disp: 30 tablet, Rfl: 0  •  Mag Oxide-Vit D3-Turmeric (MAGNESIUM-VITAMIN D3-TURMERIC PO), , Disp: , Rfl:   •  multivitamin (THERAGRAN) TABS, Take 1 tablet by mouth daily, Disp: , Rfl:   •  pantoprazole (PROTONIX) 40 mg tablet, Take 1 tablet (40 mg total) by mouth daily 1/2 hour before breakfast, Disp: 90 tablet, Rfl: 1    Allergies   Allergen Reactions   • Strawberry Extract - Food Allergy Other (See Comments)       OBJECTIVE  Vitals:   Vitals:    09/21/23 1646   BP: 130/90   BP Location: Left arm   Patient Position: Sitting   Cuff Size: Large   Pulse: 67   Temp: (!) 97.2 °F (36.2 °C)   SpO2: 97%   Weight: 96.2 kg (212 lb)   Height: 5' 5.5" (1.664 m)         Physical Exam  Vitals reviewed. Constitutional:       Appearance: She is well-developed. HENT:      Head: Normocephalic and atraumatic. Eyes:      Conjunctiva/sclera: Conjunctivae normal.      Pupils: Pupils are equal, round, and reactive to light. Cardiovascular:      Rate and Rhythm: Normal rate and regular rhythm. Heart sounds: Normal heart sounds. Pulmonary:      Effort: Pulmonary effort is normal. No respiratory distress. Breath sounds: Normal breath sounds. Musculoskeletal:         General: Normal range of motion. Cervical back: Normal range of motion and neck supple.    Skin:     General: Skin is warm.      Capillary Refill: Capillary refill takes less than 2 seconds. Neurological:      Mental Status: She is alert and oriented to person, place, and time.                     Soila Sanchez MD,   [facility]  9/21/2023

## 2023-09-25 DIAGNOSIS — I10 PRIMARY HYPERTENSION: ICD-10-CM

## 2023-09-26 RX ORDER — LOSARTAN POTASSIUM 25 MG/1
25 TABLET ORAL DAILY
Qty: 30 TABLET | Refills: 0 | Status: SHIPPED | OUTPATIENT
Start: 2023-09-26

## 2023-10-02 ENCOUNTER — HOSPITAL ENCOUNTER (OUTPATIENT)
Dept: GASTROENTEROLOGY | Facility: AMBULARY SURGERY CENTER | Age: 53
Setting detail: OUTPATIENT SURGERY
Discharge: HOME | End: 2023-10-02
Attending: INTERNAL MEDICINE
Payer: COMMERCIAL

## 2023-10-02 ENCOUNTER — ANESTHESIA (OUTPATIENT)
Dept: GASTROENTEROLOGY | Facility: AMBULARY SURGERY CENTER | Age: 53
End: 2023-10-02

## 2023-10-02 ENCOUNTER — ANESTHESIA EVENT (OUTPATIENT)
Dept: GASTROENTEROLOGY | Facility: AMBULARY SURGERY CENTER | Age: 53
End: 2023-10-02

## 2023-10-02 VITALS
DIASTOLIC BLOOD PRESSURE: 59 MMHG | HEART RATE: 50 BPM | RESPIRATION RATE: 18 BRPM | OXYGEN SATURATION: 98 % | SYSTOLIC BLOOD PRESSURE: 116 MMHG | TEMPERATURE: 97.3 F

## 2023-10-02 DIAGNOSIS — K20.0 EOSINOPHILIC ESOPHAGITIS: ICD-10-CM

## 2023-10-02 PROBLEM — I10 HTN (HYPERTENSION): Status: ACTIVE | Noted: 2023-10-02

## 2023-10-02 PROCEDURE — 88305 TISSUE EXAM BY PATHOLOGIST: CPT | Performed by: SPECIALIST

## 2023-10-02 PROCEDURE — 88313 SPECIAL STAINS GROUP 2: CPT | Performed by: SPECIALIST

## 2023-10-02 RX ORDER — LIDOCAINE HYDROCHLORIDE 10 MG/ML
INJECTION, SOLUTION EPIDURAL; INFILTRATION; INTRACAUDAL; PERINEURAL AS NEEDED
Status: DISCONTINUED | OUTPATIENT
Start: 2023-10-02 | End: 2023-10-02

## 2023-10-02 RX ORDER — GLYCOPYRROLATE 0.2 MG/ML
INJECTION INTRAMUSCULAR; INTRAVENOUS AS NEEDED
Status: DISCONTINUED | OUTPATIENT
Start: 2023-10-02 | End: 2023-10-02

## 2023-10-02 RX ORDER — SODIUM CHLORIDE, SODIUM LACTATE, POTASSIUM CHLORIDE, CALCIUM CHLORIDE 600; 310; 30; 20 MG/100ML; MG/100ML; MG/100ML; MG/100ML
125 INJECTION, SOLUTION INTRAVENOUS CONTINUOUS
Status: DISCONTINUED | OUTPATIENT
Start: 2023-10-02 | End: 2023-10-06 | Stop reason: HOSPADM

## 2023-10-02 RX ORDER — SODIUM CHLORIDE, SODIUM LACTATE, POTASSIUM CHLORIDE, CALCIUM CHLORIDE 600; 310; 30; 20 MG/100ML; MG/100ML; MG/100ML; MG/100ML
125 INJECTION, SOLUTION INTRAVENOUS CONTINUOUS
Status: CANCELLED | OUTPATIENT
Start: 2023-10-02

## 2023-10-02 RX ORDER — PROPOFOL 10 MG/ML
INJECTION, EMULSION INTRAVENOUS AS NEEDED
Status: DISCONTINUED | OUTPATIENT
Start: 2023-10-02 | End: 2023-10-02

## 2023-10-02 RX ADMIN — PROPOFOL 50 MG: 10 INJECTION, EMULSION INTRAVENOUS at 09:46

## 2023-10-02 RX ADMIN — LIDOCAINE HYDROCHLORIDE 50 MG: 10 INJECTION, SOLUTION EPIDURAL; INFILTRATION; INTRACAUDAL; PERINEURAL at 09:40

## 2023-10-02 RX ADMIN — SODIUM CHLORIDE, SODIUM LACTATE, POTASSIUM CHLORIDE, AND CALCIUM CHLORIDE 125 ML/HR: .6; .31; .03; .02 INJECTION, SOLUTION INTRAVENOUS at 08:05

## 2023-10-02 RX ADMIN — PROPOFOL 130 MG: 10 INJECTION, EMULSION INTRAVENOUS at 09:40

## 2023-10-02 RX ADMIN — GLYCOPYRROLATE 0.2 MG: 0.2 INJECTION, SOLUTION INTRAMUSCULAR; INTRAVENOUS at 09:43

## 2023-10-02 NOTE — ANESTHESIA POSTPROCEDURE EVALUATION
Post-Op Assessment Note    CV Status:  Stable  Pain Score: 0    Pain management: adequate     Mental Status:  Awake and sleepy   Hydration Status:  Euvolemic and stable   PONV Controlled:  Controlled   Airway Patency:  Patent      Post Op Vitals Reviewed: Yes      Staff: CRNA         No notable events documented.     /59 (10/02/23 0950)    Temp     Pulse 69 (10/02/23 0950)   Resp 18 (10/02/23 0950)    SpO2 94 % (10/02/23 0950)

## 2023-10-02 NOTE — H&P
History and Physical - [facility]  Luzmaria Powers 48 y.o. female MRN: [id number]                  HPI: Luzmaria Powers is a 48y.o. year old female who presents for eosinophilic esophagitis      REVIEW OF SYSTEMS: Per the HPI, and otherwise unremarkable.     Historical Information   Past Medical History:   Diagnosis Date   • Allergic     seasonal   • Asthma     very rarely   • GERD (gastroesophageal reflux disease) 10+ years ago   • Hypertension    • Obesity    • Patient denies medical problems    • PONV (postoperative nausea and vomiting)      Past Surgical History:   Procedure Laterality Date   • ADENOIDECTOMY     •  SECTION      x1   • TONSILLECTOMY       Social History   Social History     Substance and Sexual Activity   Alcohol Use Yes    Comment: socially     Social History     Substance and Sexual Activity   Drug Use Never     Social History     Tobacco Use   Smoking Status Former   • Packs/day: 0.50   • Years: 15.00   • Total pack years: 7.50   • Types: Cigarettes   • Start date: 1986   • Quit date: 2001   • Years since quittin.5   Smokeless Tobacco Never     Family History   Problem Relation Age of Onset   • Breast cancer Mother 76   • Hypertension Mother    • Melanoma Mother 79   • Coronary artery disease Mother    • Arthritis Mother    • Heart disease Father          at 79   • Hyperlipidemia Father    • Prostate cancer Father 72   • Diabetes Father        Meds/Allergies       Current Outpatient Medications:   •  Calcium 500 MG tablet  •  losartan (COZAAR) 25 mg tablet  •  Mag Oxide-Vit D3-Turmeric (MAGNESIUM-VITAMIN D3-TURMERIC PO)  •  multivitamin (THERAGRAN) TABS  •  pantoprazole (PROTONIX) 40 mg tablet    Current Facility-Administered Medications:   •  lactated ringers infusion, 125 mL/hr, Intravenous, Continuous, Continue from Pre-op at 10/02/23 0851    Allergies   Allergen Reactions   • Strawberry Extract - Food Allergy Other (See Comments)       Objective /70   Pulse (!) 52   Temp (!) 97.3 °F (36.3 °C) (Temporal)   Resp 16   SpO2 98%       PHYSICAL EXAM    Gen: NAD  Head: NCAT  CV: RRR  CHEST: Clear  ABD: soft, NT/ND  EXT: no edema      ASSESSMENT/PLAN:  This is a 48y.o. year old female here for EGD, and she is stable and optimized for her procedure.

## 2023-10-02 NOTE — ANESTHESIA PREPROCEDURE EVALUATION
Procedure:  EGD    Relevant Problems   ANESTHESIA   (+) PONV (postoperative nausea and vomiting)      CARDIO   (+) HTN (hypertension)      GI/HEPATIC   (+) Gastroesophageal reflux disease      Digestive   (+) Eosinophilic esophagitis      Other   (+) Obesity (BMI 30.0-34. 9)        Physical Exam    Airway    Mallampati score: II  TM Distance: >3 FB  Neck ROM: full     Dental   Comment: Denies loose teeth,     Cardiovascular  Cardiovascular exam normal    Pulmonary  Pulmonary exam normal     Other Findings  Portions of exam deferred due to low yield and/or unknown COVID status      Anesthesia Plan  ASA Score- 2     Anesthesia Type- IV sedation with anesthesia with ASA Monitors. Additional Monitors:   Airway Plan:           Plan Factors-Exercise tolerance (METS): >4 METS. Chart reviewed. Existing labs reviewed. Patient summary reviewed. Patient is not a current smoker. Induction- intravenous. Postoperative Plan-     Informed Consent- Anesthetic plan and risks discussed with patient. I personally reviewed this patient with the CRNA. Discussed and agreed on the Anesthesia Plan with the CRNA. Nazia Bejarano           Received zofran during last egd/colonoscopy-- no PONV

## 2023-10-05 PROCEDURE — 88305 TISSUE EXAM BY PATHOLOGIST: CPT | Performed by: SPECIALIST

## 2023-10-05 PROCEDURE — 88313 SPECIAL STAINS GROUP 2: CPT | Performed by: SPECIALIST

## 2023-10-11 ENCOUNTER — TELEPHONE (OUTPATIENT)
Age: 53
End: 2023-10-11

## 2023-10-11 DIAGNOSIS — I10 PRIMARY HYPERTENSION: Primary | ICD-10-CM

## 2023-10-11 RX ORDER — LOSARTAN POTASSIUM 25 MG/1
25 TABLET ORAL 2 TIMES DAILY
Qty: 180 TABLET | Refills: 0 | Status: SHIPPED | OUTPATIENT
Start: 2023-10-11 | End: 2023-10-26

## 2023-10-11 RX ORDER — LOSARTAN POTASSIUM 25 MG/1
TABLET ORAL
Qty: 180 TABLET | Refills: 0 | Status: SHIPPED | OUTPATIENT
Start: 2023-10-11 | End: 2023-10-11 | Stop reason: SDUPTHER

## 2023-10-11 NOTE — TELEPHONE ENCOUNTER
Losartan 25mgs BID recommend continue. Given she has Lows.  Gave her info for cardiology and referral. If she can't get in 1-2 week recommend calling back for BP check with myself

## 2023-10-11 NOTE — TELEPHONE ENCOUNTER
First ask if the HIGH 10/5 and 10/10 are from the first in the morning prior to meds? Because If I increase her we will be dealing with too many lows.

## 2023-10-11 NOTE — TELEPHONE ENCOUNTER
I called pt and she said 10/5 and 10/10 BP were taken in the middle of the night she will wake up from symptoms and will take her BP. She takes her meds 1 in the am and 1 pm (dinner time).

## 2023-10-11 NOTE — TELEPHONE ENCOUNTER
Patient called stating that her blood pressure has been elevated at night. Patient would like to know how she should proceed.     10/5-179/106  10/6-116/63  10/7-109/54  10/8-116/65  10/9-125/58  10//90

## 2023-10-12 NOTE — RESULT ENCOUNTER NOTE
Eulalia Lloyd, your esophageal biopsies show persistent inflammation from eosinophilic esophagitis, though this appears to be improved from your prior endoscopy earlier this year.   If you continue to feel well with no significant symptoms, I would recommend continuing pantoprazole and following up with me in the office as scheduled to discuss a long-term plan

## 2023-10-16 ENCOUNTER — TELEPHONE (OUTPATIENT)
Age: 53
End: 2023-10-16

## 2023-10-16 NOTE — TELEPHONE ENCOUNTER
Pt called to say she was admitted in the Hospital on Thursday 10/12 and released Saturday 10/14. Attempted warm transfer to office, please reach out to schedule TCM.

## 2023-10-19 ENCOUNTER — OFFICE VISIT (OUTPATIENT)
Dept: GASTROENTEROLOGY | Facility: CLINIC | Age: 53
End: 2023-10-19
Payer: COMMERCIAL

## 2023-10-19 VITALS
WEIGHT: 201.5 LBS | HEIGHT: 65 IN | BODY MASS INDEX: 33.57 KG/M2 | HEART RATE: 71 BPM | DIASTOLIC BLOOD PRESSURE: 65 MMHG | SYSTOLIC BLOOD PRESSURE: 107 MMHG

## 2023-10-19 DIAGNOSIS — K20.0 EOSINOPHILIC ESOPHAGITIS: Primary | ICD-10-CM

## 2023-10-19 DIAGNOSIS — Z12.11 COLON CANCER SCREENING: ICD-10-CM

## 2023-10-19 PROCEDURE — 99213 OFFICE O/P EST LOW 20 MIN: CPT | Performed by: INTERNAL MEDICINE

## 2023-10-19 RX ORDER — ATORVASTATIN CALCIUM 80 MG/1
TABLET, FILM COATED ORAL
COMMUNITY
Start: 2023-10-13

## 2023-10-19 RX ORDER — CARVEDILOL 3.12 MG/1
TABLET ORAL
COMMUNITY
Start: 2023-10-13

## 2023-10-19 RX ORDER — ASPIRIN 81 MG/1
TABLET ORAL
COMMUNITY
Start: 2023-10-13

## 2023-10-19 RX ORDER — SACUBITRIL AND VALSARTAN 24; 26 MG/1; MG/1
TABLET, FILM COATED ORAL
COMMUNITY
Start: 2023-10-13

## 2023-10-19 RX ORDER — EMPAGLIFLOZIN 10 MG/1
TABLET, FILM COATED ORAL
COMMUNITY
Start: 2023-10-13

## 2023-10-19 NOTE — PROGRESS NOTES
[facility] - Outpatient Follow-up Note  Alanna Hurtado 48 y.o. female MRN: [id number]  Encounter: [id number]          ASSESSMENT AND PLAN:      1. Eosinophilic esophagitis  We will plan to continue pantoprazole. If symptoms worsen we will plan another course of swallowed fluticasone. We will repeat EGD likely in approximately 6 months for repeat biopsy to evaluate for continued improvement. Contingency would include use of Dupixent    2. Colon cancer screening  Repeat colonoscopy in     ______________________________________________________________________    SUBJECTIVE: Laurel Macedo returns in follow-up of eosinophilic esophagitis. Recent EGD revealed persistent eosinophilia with approximate 44 eosinophils per high-power field distally and 25 approximately. This seems to be significantly improved from >50 per high-power field on prior EGD. She has been on pantoprazole 40 mg daily. Has minimal symptoms but does experience occasional distal esophageal dysphagia especially if she eats quickly. REVIEW OF SYSTEMS:    Review of Systems   Cardiovascular:         Recent MRI with coronary stent placement   Gastrointestinal:  Positive for abdominal pain and diarrhea.           Historical Information   Past Medical History:   Diagnosis Date    Allergic     seasonal    Asthma     very rarely    GERD (gastroesophageal reflux disease) 10+ years ago    Hypertension     Obesity     Patient denies medical problems     PONV (postoperative nausea and vomiting)      Past Surgical History:   Procedure Laterality Date    ADENOIDECTOMY       SECTION      x1    TONSILLECTOMY       Social History   Social History     Substance and Sexual Activity   Alcohol Use Yes    Comment: socially     Social History     Substance and Sexual Activity   Drug Use Never     Social History     Tobacco Use   Smoking Status Former    Packs/day: 0.50    Years: 15.00    Total pack years: 7.50    Types: Cigarettes    Start date: 1986    Quit date: 2001    Years since quittin.5   Smokeless Tobacco Never     Family History   Problem Relation Age of Onset    Breast cancer Mother 76    Hypertension Mother     Melanoma Mother 79    Coronary artery disease Mother     Arthritis Mother     Heart disease Father          at 79    Hyperlipidemia Father     Prostate cancer Father 72    Diabetes Father        Meds/Allergies       Current Outpatient Medications:     Aspir-Low 81 MG EC tablet    atorvastatin (LIPITOR) 80 mg tablet    Calcium 500 MG tablet    carvedilol (COREG) 3.125 mg tablet    Entresto 24-26 MG TABS    Jardiance 10 MG TABS tablet    multivitamin (THERAGRAN) TABS    pantoprazole (PROTONIX) 40 mg tablet    Spironolactone 25 MG/5ML SUSP    ticagrelor (Brilinta) 90 MG    losartan (COZAAR) 25 mg tablet    Mag Oxide-Vit D3-Turmeric (MAGNESIUM-VITAMIN D3-TURMERIC PO)    Allergies   Allergen Reactions    Strawberry Extract - Food Allergy Other (See Comments)           Objective     Blood pressure 107/65, pulse 71, height 5' 5" (1.651 m), weight 91.4 kg (201 lb 8 oz). Body mass index is 33.53 kg/m². PHYSICAL EXAM:      Physical Exam  Vitals and nursing note reviewed. Constitutional:       General: She is not in acute distress. Appearance: She is not ill-appearing. HENT:      Head: Normocephalic and atraumatic. Eyes:      General: No scleral icterus. Extraocular Movements: Extraocular movements intact. Cardiovascular:      Rate and Rhythm: Normal rate. Pulmonary:      Effort: Pulmonary effort is normal. No respiratory distress. Skin:     General: Skin is warm and dry. Coloration: Skin is not cyanotic. Findings: No erythema. Neurological:      General: No focal deficit present. Mental Status: She is alert and oriented to person, place, and time.    Psychiatric:         Mood and Affect: Mood normal.         Behavior: Behavior normal.          Lab Results:   No visits with results within 1 Day(s) from this visit. Latest known visit with results is:   Hospital Outpatient Visit on 10/02/2023   Component Date Value    Case Report 10/02/2023                      Value:Surgical Pathology Report                         Case: [id number]                                   Authorizing Provider:  Jackline Phalen, MD         Collected:           10/02/2023 [address]              Ordering Location:     [facility]   Received:            10/02/2023 [address] [facility],[address]                                                                       Pathologist:           Corrinne Hatchet, MD                                                     Specimens:   A) - Esophagus, distal esophagus r/o barretts                                                       B) - Esophagus, distal esophagus r/o eoe                                                            C) - Esophagus, proximal esophagus r/o eoe                                                 Final Diagnosis 10/02/2023                      Value: This result contains rich text formatting which cannot be displayed here. Note 10/02/2023                      Value: This result contains rich text formatting which cannot be displayed here. Additional Information 10/02/2023                      Value: This result contains rich text formatting which cannot be displayed here. Gross Description 10/02/2023                      Value: This result contains rich text formatting which cannot be displayed here.          Radiology Results:   XR chest portable    Result Date: 10/13/2023  Narrative: [facility] [place] [address]         Dear Doctor, Following is the report on the above-named patient: COMPLETION DATE: Oct 12 2023  [id number] [facility] -------Final Report------- RESULT:  CHEST X-RAY CLINICAL STATEMENT: Dyspnea TECHNIQUE: Single AP view of the chest performed 10/12/2023 12:19 PM COMPARISON: 10/24/2015 FINDINGS:  Cardiac silhouette is within normal limits in size. Mediastinal silhouette demonstrates no contour-deforming abnormality. The lungs are clear of consolidation or suspicious airspace opacity. There is no pleural effusion. Osseous structures are intact. IMPRESSION: No acute pulmonary disease. Read by : Samantha Graves MD Reviewed Images and Approved by: Samantha Graves MD On: Oct 13 2023  8:38A    TRANSTHORACIC ECHO (TTE) COMPLETE    Result Date: 10/13/2023  Narrative: Ordered by an unspecified provider. EGD    Result Date: 10/2/2023  Narrative: Table formatting from the original result was not included. [address] [place] [address],[address] [id number] [facility] [address] [phone] DATE OF SERVICE: 10/02/23 PHYSICIAN(S): Attending: Venkatesh Collado MD Fellow: No Staff Documented INDICATION: Eosinophilic esophagitis POST-OP DIAGNOSIS: See the impression below. PREPROCEDURE: Informed consent was obtained for the procedure, including sedation. Risks of perforation, hemorrhage, adverse drug reaction and aspiration were discussed. The patient was placed in the left lateral decubitus position. Patient was explained about the risks and benefits of the procedure. Risks including but not limited to bleeding, infection, and perforation were explained in detail. Also explained about less than 100% sensitivity with the exam and other alternatives. PROCEDURE: EGD DETAILS OF PROCEDURE: Patient was taken to the procedure room where a time out was performed to confirm correct patient and correct procedure. The patient underwent monitored anesthesia care, which was administered by an anesthesia professional. The patient's blood pressure, heart rate, level of consciousness, respirations and oxygen were monitored throughout the procedure. The scope was advanced to the second part of the duodenum.  Retroflexion was performed in the fundus. The patient's estimated blood loss was minimal (<5 mL). The procedure was not difficult. The patient tolerated the procedure well. There were no apparent adverse events. ANESTHESIA INFORMATION: ASA: II Anesthesia Type: IV Sedation with Anesthesia MEDICATIONS: No administrations occurring from 0930 to 0949 on 10/02/23 FINDINGS: Abnormal mucosa with linear furrows in the esophagus; performed cold forceps biopsy to rule out eosinophilic esophagitis U9I3 Perez's esophagus observed with 2 tongues and no associated lesion.  The diaphragmatic impression was 40 cm from the incisors, Z-line was 37 cm from the incisors and the top of gastric folds was 38 cm from the incisors; performed cold forceps biopsy 2 cm hiatal hernia:  Hill classification: Grade II The duodenum appeared normal. SPECIMENS: ID Type Source Tests Collected by Time Destination 1 : distal esophagus r/o barretts Tissue Esophagus TISSUE EXAM Mark Wilson MD 10/2/2023  9:44 AM  2 : distal esophagus r/o eoe Tissue Esophagus TISSUE Jeremiah Hernandez MD 10/2/2023  9:45 AM  3 : proximal esophagus r/o eoe Tissue Esophagus TISSUE EXAM Mark Wilson MD 10/2/2023  9:47 AM      Impression: Abnormal mucosa with linear furrows in the esophagus; performed cold forceps biopsy [id number] Perez's esophagus; performed cold forceps biopsy 2 cm hiatal hernia The duodenum appeared normal. RECOMMENDATION:  Await pathology results  Follow up with me in clinic   Mark Wilson MD   Answers submitted by the patient for this visit:  Abdominal Pain Questionnaire (Submitted on 10/19/2023)  Chief Complaint: Abdominal pain  Diagnostic workup: upper endoscopy

## 2023-10-20 ENCOUNTER — TRANSITIONAL CARE MANAGEMENT (OUTPATIENT)
Dept: FAMILY MEDICINE CLINIC | Facility: CLINIC | Age: 53
End: 2023-10-20

## 2023-10-26 ENCOUNTER — OFFICE VISIT (OUTPATIENT)
Dept: FAMILY MEDICINE CLINIC | Facility: CLINIC | Age: 53
End: 2023-10-26
Payer: COMMERCIAL

## 2023-10-26 VITALS
SYSTOLIC BLOOD PRESSURE: 92 MMHG | WEIGHT: 205 LBS | RESPIRATION RATE: 14 BRPM | TEMPERATURE: 97.7 F | DIASTOLIC BLOOD PRESSURE: 60 MMHG | OXYGEN SATURATION: 99 % | BODY MASS INDEX: 34.16 KG/M2 | HEART RATE: 70 BPM | HEIGHT: 65 IN

## 2023-10-26 DIAGNOSIS — I25.10 CORONARY ARTERY DISEASE DUE TO LIPID RICH PLAQUE: Primary | ICD-10-CM

## 2023-10-26 DIAGNOSIS — K21.9 GASTROESOPHAGEAL REFLUX DISEASE, UNSPECIFIED WHETHER ESOPHAGITIS PRESENT: ICD-10-CM

## 2023-10-26 DIAGNOSIS — Z95.5 HISTORY OF PLACEMENT OF STENT IN LAD CORONARY ARTERY: ICD-10-CM

## 2023-10-26 DIAGNOSIS — K20.0 EOSINOPHILIC ESOPHAGITIS: ICD-10-CM

## 2023-10-26 DIAGNOSIS — I25.83 CORONARY ARTERY DISEASE DUE TO LIPID RICH PLAQUE: Primary | ICD-10-CM

## 2023-10-26 PROCEDURE — 99495 TRANSJ CARE MGMT MOD F2F 14D: CPT | Performed by: FAMILY MEDICINE

## 2023-10-26 RX ORDER — CARVEDILOL 6.25 MG/1
6.25 TABLET ORAL DAILY
COMMUNITY
Start: 2023-10-26 | End: 2023-10-26

## 2023-10-26 RX ORDER — EZETIMIBE 10 MG/1
10 TABLET ORAL DAILY
COMMUNITY
Start: 2023-10-26

## 2023-10-26 NOTE — PROGRESS NOTES
Assessment & Plan     1. Coronary artery disease due to lipid rich plaque    2. Eosinophilic esophagitis    3. Gastroesophageal reflux disease, unspecified whether esophagitis present    4. History of placement of stent in LAD coronary artery        11/6/2023-> return to work   Continue on all medications with no changes at this time         Subjective     Transitional Care Management Review:   Hillary Castaneda is a 48 y.o. female here for TCM follow up. During the TCM phone call patient stated:  TCM Call     Date and time call was made  10/12/2023  9:20 AM    Hospital care reviewed  Records reviewed    Patient was hospitialized at  [facility] (comment)    [address] [place] [address]    Date of Admission  10/12/23    Date of discharge  10/12/23    Diagnosis  chest pain-CARDIAC CATHERIZATION    Disposition  Home    Were the patients medications reviewed and updated  Yes    Current Symptoms  Shortness of breath    Shortness of breath severity  Mild      TCM Call     Post hospital issues  Reduced activity    Should patient be enrolled in anticoag monitoring? No    Scheduled for follow up?   Yes    Patients specialists  Cardiologist    Cardiologist name  does not have the information at the time of call    Did you obtain your prescribed medications  Yes    Do you need help managing your prescriptions or medications  Yes    Is transportation to your appointment needed  No    Living Arrangements  Spouse or Significiant other; Children; parents    Are you recieving any outpatient services  Yes    Are you recieving home care services  No    Are you using any community resources  No    Current waiver services  No    Have you fallen in the last 12 months  No    Interperter language line needed  No    Counseling  Patient        HPI    80-year-old woman presented to our office for uncontrolled high blood pressure unfortunately the patient was supposed to be seen by cardiology and started with chest pain was found to have a blockage of LAD and required stent placement. Patient just recently saw cardiology this morning we will be initiating cardiac rehab  Patient states that overall she has been doing fine just a little fatigued. Is set to go back to work November 6. Review of Systems   Constitutional:  Positive for fatigue. Negative for activity change, appetite change, chills and fever. HENT:  Negative for congestion. Respiratory:  Negative for cough, chest tightness and shortness of breath. Cardiovascular:  Negative for chest pain and leg swelling. Gastrointestinal:  Negative for abdominal distention, abdominal pain, constipation, diarrhea, nausea and vomiting. All other systems reviewed and are negative. Objective     BP 92/60 (BP Location: Left arm, Patient Position: Sitting, Cuff Size: Adult)   Pulse 70   Temp 97.7 °F (36.5 °C) (Temporal)   Resp 14   Ht 5' 5" (1.651 m)   Wt 93 kg (205 lb)   SpO2 99%   BMI 34.11 kg/m²      Physical Exam  Vitals reviewed. Constitutional:       Appearance: Normal appearance. She is well-developed. HENT:      Head: Normocephalic and atraumatic. Right Ear: Tympanic membrane, ear canal and external ear normal. There is no impacted cerumen. Left Ear: Tympanic membrane, ear canal and external ear normal. There is no impacted cerumen. Nose: Nose normal.      Mouth/Throat:      Mouth: Mucous membranes are moist.      Pharynx: Oropharynx is clear. Eyes:      Conjunctiva/sclera: Conjunctivae normal.      Pupils: Pupils are equal, round, and reactive to light. Cardiovascular:      Rate and Rhythm: Normal rate and regular rhythm. Heart sounds: Normal heart sounds. Pulmonary:      Effort: Pulmonary effort is normal.      Breath sounds: Normal breath sounds. Musculoskeletal:         General: Normal range of motion. Cervical back: Normal range of motion and neck supple. Skin:     General: Skin is warm.       Capillary Refill: Capillary refill takes less than 2 seconds. Neurological:      General: No focal deficit present. Mental Status: She is alert and oriented to person, place, and time. Mental status is at baseline. Psychiatric:         Mood and Affect: Mood normal.         Behavior: Behavior normal.         Thought Content:  Thought content normal.         Judgment: Judgment normal.       Medications have been reviewed by provider in current encounter    Yulissa Broderick MD

## 2023-11-16 ENCOUNTER — TELEPHONE (OUTPATIENT)
Age: 53
End: 2023-11-16

## 2023-11-16 NOTE — TELEPHONE ENCOUNTER
Pt called in for an appt but there was no availability today and she couldn't come in tomorrow until after 4. Pt will go to an Urgent Care.

## 2024-02-02 ENCOUNTER — APPOINTMENT (OUTPATIENT)
Dept: LAB | Facility: HOSPITAL | Age: 54
End: 2024-02-02
Payer: COMMERCIAL

## 2024-02-02 DIAGNOSIS — E78.00 PURE HYPERCHOLESTEROLEMIA: ICD-10-CM

## 2024-02-02 PROCEDURE — 36415 COLL VENOUS BLD VENIPUNCTURE: CPT

## 2024-02-02 PROCEDURE — 83695 ASSAY OF LIPOPROTEIN(A): CPT

## 2024-02-05 LAB — LPA SERPL-SCNC: 359.4 NMOL/L

## 2024-03-12 ENCOUNTER — HOSPITAL ENCOUNTER (OUTPATIENT)
Dept: RADIOLOGY | Facility: HOSPITAL | Age: 54
Discharge: HOME | End: 2024-03-12
Payer: COMMERCIAL

## 2024-03-12 ENCOUNTER — TELEPHONE (OUTPATIENT)
Age: 54
End: 2024-03-12

## 2024-03-12 VITALS — BODY MASS INDEX: 34.16 KG/M2 | HEIGHT: 65 IN | WEIGHT: 205 LBS

## 2024-03-12 DIAGNOSIS — Z12.31 SCREENING MAMMOGRAM FOR BREAST CANCER: ICD-10-CM

## 2024-03-12 PROCEDURE — 77063 BREAST TOMOSYNTHESIS BI: CPT

## 2024-03-12 PROCEDURE — 77067 SCR MAMMO BI INCL CAD: CPT

## 2024-03-12 NOTE — TELEPHONE ENCOUNTER
Sandrita called in asking for labs to be put in for her before her annual with Dr. Delgado on 3/28. Please advise

## 2024-03-21 DIAGNOSIS — E78.5 HYPERLIPIDEMIA, UNSPECIFIED HYPERLIPIDEMIA TYPE: Primary | ICD-10-CM

## 2024-03-21 DIAGNOSIS — I10 PRIMARY HYPERTENSION: ICD-10-CM

## 2024-03-22 ENCOUNTER — RA CDI HCC (OUTPATIENT)
Dept: OTHER | Facility: HOSPITAL | Age: 54
End: 2024-03-22

## 2024-03-23 ENCOUNTER — APPOINTMENT (OUTPATIENT)
Dept: LAB | Facility: HOSPITAL | Age: 54
End: 2024-03-23
Attending: FAMILY MEDICINE
Payer: COMMERCIAL

## 2024-03-23 DIAGNOSIS — I10 PRIMARY HYPERTENSION: ICD-10-CM

## 2024-03-23 DIAGNOSIS — E78.5 HYPERLIPIDEMIA, UNSPECIFIED HYPERLIPIDEMIA TYPE: ICD-10-CM

## 2024-03-23 LAB
ALBUMIN SERPL BCP-MCNC: 3.9 G/DL (ref 3.5–5)
ALP SERPL-CCNC: 86 U/L (ref 34–104)
ALT SERPL W P-5'-P-CCNC: 24 U/L (ref 7–52)
ANION GAP SERPL CALCULATED.3IONS-SCNC: 0 MMOL/L (ref 4–13)
AST SERPL W P-5'-P-CCNC: 19 U/L (ref 13–39)
BASOPHILS # BLD AUTO: 0.02 THOUSANDS/ÂΜL (ref 0–0.1)
BASOPHILS NFR BLD AUTO: 0 % (ref 0–1)
BILIRUB SERPL-MCNC: 0.44 MG/DL (ref 0.2–1)
BUN SERPL-MCNC: 22 MG/DL (ref 5–25)
CALCIUM SERPL-MCNC: 8.6 MG/DL (ref 8.4–10.2)
CHLORIDE SERPL-SCNC: 109 MMOL/L (ref 96–108)
CHOLEST SERPL-MCNC: 109 MG/DL
CO2 SERPL-SCNC: 25 MMOL/L (ref 21–32)
CREAT SERPL-MCNC: 0.84 MG/DL (ref 0.6–1.3)
EOSINOPHIL # BLD AUTO: 0.19 THOUSAND/ÂΜL (ref 0–0.61)
EOSINOPHIL NFR BLD AUTO: 2 % (ref 0–6)
ERYTHROCYTE [DISTWIDTH] IN BLOOD BY AUTOMATED COUNT: 13.3 % (ref 11.6–15.1)
GFR SERPL CREATININE-BSD FRML MDRD: 79 ML/MIN/1.73SQ M
GLUCOSE P FAST SERPL-MCNC: 93 MG/DL (ref 65–99)
HCT VFR BLD AUTO: 42.4 % (ref 34.8–46.1)
HDLC SERPL-MCNC: 47 MG/DL
HGB BLD-MCNC: 14.2 G/DL (ref 11.5–15.4)
IMM GRANULOCYTES # BLD AUTO: 0.05 THOUSAND/UL (ref 0–0.2)
IMM GRANULOCYTES NFR BLD AUTO: 1 % (ref 0–2)
LDLC SERPL CALC-MCNC: 42 MG/DL (ref 0–100)
LYMPHOCYTES # BLD AUTO: 1.68 THOUSANDS/ÂΜL (ref 0.6–4.47)
LYMPHOCYTES NFR BLD AUTO: 17 % (ref 14–44)
MCH RBC QN AUTO: 30.1 PG (ref 26.8–34.3)
MCHC RBC AUTO-ENTMCNC: 33.5 G/DL (ref 31.4–37.4)
MCV RBC AUTO: 90 FL (ref 82–98)
MONOCYTES # BLD AUTO: 0.79 THOUSAND/ÂΜL (ref 0.17–1.22)
MONOCYTES NFR BLD AUTO: 8 % (ref 4–12)
NEUTROPHILS # BLD AUTO: 6.94 THOUSANDS/ÂΜL (ref 1.85–7.62)
NEUTS SEG NFR BLD AUTO: 72 % (ref 43–75)
NONHDLC SERPL-MCNC: 62 MG/DL
NRBC BLD AUTO-RTO: 0 /100 WBCS
PLATELET # BLD AUTO: 217 THOUSANDS/UL (ref 149–390)
PMV BLD AUTO: 10.4 FL (ref 8.9–12.7)
POTASSIUM SERPL-SCNC: 4 MMOL/L (ref 3.5–5.3)
PROT SERPL-MCNC: 6.8 G/DL (ref 6.4–8.4)
RBC # BLD AUTO: 4.72 MILLION/UL (ref 3.81–5.12)
SODIUM SERPL-SCNC: 134 MMOL/L (ref 135–147)
TRIGL SERPL-MCNC: 100 MG/DL
WBC # BLD AUTO: 9.67 THOUSAND/UL (ref 4.31–10.16)

## 2024-03-23 PROCEDURE — 36415 COLL VENOUS BLD VENIPUNCTURE: CPT

## 2024-03-23 PROCEDURE — 80061 LIPID PANEL: CPT

## 2024-03-23 PROCEDURE — 85025 COMPLETE CBC W/AUTO DIFF WBC: CPT

## 2024-03-23 PROCEDURE — 80053 COMPREHEN METABOLIC PANEL: CPT

## 2024-03-28 ENCOUNTER — OFFICE VISIT (OUTPATIENT)
Dept: FAMILY MEDICINE CLINIC | Facility: CLINIC | Age: 54
End: 2024-03-28
Payer: COMMERCIAL

## 2024-03-28 VITALS
HEART RATE: 80 BPM | WEIGHT: 211 LBS | BODY MASS INDEX: 35.16 KG/M2 | HEIGHT: 65 IN | TEMPERATURE: 97.5 F | RESPIRATION RATE: 18 BRPM | SYSTOLIC BLOOD PRESSURE: 136 MMHG | DIASTOLIC BLOOD PRESSURE: 90 MMHG

## 2024-03-28 DIAGNOSIS — Z00.00 PHYSICAL EXAM: ICD-10-CM

## 2024-03-28 DIAGNOSIS — I25.83 CORONARY ARTERY DISEASE DUE TO LIPID RICH PLAQUE: ICD-10-CM

## 2024-03-28 DIAGNOSIS — E78.5 HYPERLIPIDEMIA, UNSPECIFIED HYPERLIPIDEMIA TYPE: ICD-10-CM

## 2024-03-28 DIAGNOSIS — I25.10 CORONARY ARTERY DISEASE DUE TO LIPID RICH PLAQUE: ICD-10-CM

## 2024-03-28 DIAGNOSIS — R73.09 ABNORMAL GLUCOSE: ICD-10-CM

## 2024-03-28 DIAGNOSIS — I10 PRIMARY HYPERTENSION: ICD-10-CM

## 2024-03-28 PROCEDURE — 99396 PREV VISIT EST AGE 40-64: CPT | Performed by: FAMILY MEDICINE

## 2024-03-28 NOTE — PROGRESS NOTES
ADULT ANNUAL PHYSICAL  [facility]    NAME: Sandrita Phillip  AGE: 53 y.o. SEX: female  : 1970     DATE: 3/28/2024     Assessment and Plan:     Problem List Items Addressed This Visit        Cardiovascular and Mediastinum    HTN (hypertension)    Relevant Orders    Basic metabolic panel   Other Visit Diagnoses     Physical exam        Relevant Orders    Ambulatory Referral to Obstetrics / Gynecology    Hyperlipidemia, unspecified hyperlipidemia type        Abnormal glucose        Coronary artery disease due to lipid rich plaque          Hypertension being managed by her cardiologist.  Patient is currently doing very well ever since having the coronary artery disease/MI.  Completed cardiac rehab  Abnormal glucose currently on Jardiance only for cardiac setting however it is helping with her fasting sugars  I believe that her low sodium was secondary to the water intake that she did prior to the testing will repeat levels  Will follow-up with the patient every 6 months  Referral for OB given           No follow-ups on file.     Chief Complaint:     Chief Complaint   Patient presents with   • Physical Exam     Referral for OBGYN in [facility]      History of Present Illness:     Adult Annual Physical   Patient here for a comprehensive physical exam.   Patient states overall she has been doing very well.  Completed cardiac rehab.  Taking all her medications as prescribed without any side effects.  States that she was started on a double medication for her cholesterol.  As well started on Jardiance for cardiac protection    Diet and Physical Activity  Diet/Nutrition: well balanced diet.   Exercise: walking.      Depression Screening  PHQ-2/9 Depression Screening    Little interest or pleasure in doing things: 0 - not at all  Feeling down, depressed, or hopeless: 0 - not at all  PHQ-2 Score: 0  PHQ-2 Interpretation: Negative depression screen       General  Health  Sleep: gets 7-8 hours of sleep on average.   Hearing: normal - bilateral.  Vision: no vision problems and wears glasses.   Dental: regular dental visits.       /GYN Health  Follows with gynecology? no   2-3 years last Pap, No abnormalities       Review of Systems:     Review of Systems   Constitutional:  Negative for activity change, appetite change, chills, fatigue and fever.   HENT:  Negative for congestion.    Respiratory:  Negative for cough, chest tightness and shortness of breath.    Cardiovascular:  Negative for chest pain and leg swelling.   Gastrointestinal:  Negative for abdominal distention, abdominal pain, constipation, diarrhea, nausea and vomiting.   Neurological:  Positive for headaches (stress).   All other systems reviewed and are negative.     Past Medical History:     Past Medical History:   Diagnosis Date   • Allergic     seasonal   • Asthma     very rarely   • GERD (gastroesophageal reflux disease) 10+ years ago   • Hypertension    • Obesity    • Patient denies medical problems    • PONV (postoperative nausea and vomiting)       Past Surgical History:     Past Surgical History:   Procedure Laterality Date   • ADENOIDECTOMY     •  SECTION      x1   • TONSILLECTOMY        Social History:     Social History     Socioeconomic History   • Marital status: Single     Spouse name: None   • Number of children: None   • Years of education: None   • Highest education level: None   Occupational History   • None   Tobacco Use   • Smoking status: Former     Current packs/day: 0.00     Average packs/day: 0.5 packs/day for 15.0 years (7.5 ttl pk-yrs)     Types: Cigarettes     Start date: 1986     Quit date: 2001     Years since quittin.0   • Smokeless tobacco: Never   Vaping Use   • Vaping status: Never Used   Substance and Sexual Activity   • Alcohol use: Yes     Comment: socially   • Drug use: Never   • Sexual activity: Yes     Partners: Male     Birth control/protection: Condom  "Male   Other Topics Concern   • None   Social History Narrative   • None     Social Determinants of Health     Financial Resource Strain: Not on file   Food Insecurity: Not on file   Transportation Needs: Not on file   Physical Activity: Not on file   Stress: Not on file   Social Connections: Not on file   Intimate Partner Violence: Not on file   Housing Stability: Not on file      Family History:     Family History   Problem Relation Age of Onset   • Breast cancer Mother 68   • Hypertension Mother    • Melanoma Mother 67   • Coronary artery disease Mother    • Arthritis Mother    • Heart disease Father          at 70   • Hyperlipidemia Father    • Prostate cancer Father 65   • Diabetes Father       Current Medications:     Current Outpatient Medications   Medication Sig Dispense Refill   • Aspir-Low 81 MG EC tablet      • atorvastatin (LIPITOR) 80 mg tablet      • Calcium 500 MG tablet Take 500 mg by mouth     • carvedilol (COREG) 3.125 mg tablet      • Entresto 24-26 MG TABS Take by mouth in the morning     • ezetimibe (ZETIA) 10 mg tablet Take 10 mg by mouth daily     • Jardiance 10 MG TABS tablet Take 10 mg by mouth daily     • multivitamin (THERAGRAN) TABS Take 1 tablet by mouth daily     • pantoprazole (PROTONIX) 40 mg tablet Take 1 tablet (40 mg total) by mouth daily 1/2 hour before breakfast 90 tablet 1   • Spironolactone 25 MG/5ML SUSP Take 25 mg by mouth in the morning     • ticagrelor (Brilinta) 90 MG Take 90 mg by mouth 2 (two) times a day       No current facility-administered medications for this visit.      Allergies:     Allergies   Allergen Reactions   • Strawberry Extract - Food Allergy Other (See Comments)      Physical Exam:     /90 (BP Location: Left arm, Patient Position: Sitting, Cuff Size: Standard)   Pulse 80   Temp 97.5 °F (36.4 °C) (Temporal)   Resp 18   Ht 5' 5\" (1.651 m)   Wt 95.7 kg (211 lb)   LMP 2022 (Approximate)   BMI 35.11 kg/m²     Physical Exam  Vitals " reviewed.   Constitutional:       Appearance: Normal appearance. She is well-developed.   HENT:      Head: Normocephalic and atraumatic.      Right Ear: Tympanic membrane, ear canal and external ear normal. There is no impacted cerumen.      Left Ear: Tympanic membrane, ear canal and external ear normal. There is no impacted cerumen.      Nose: Nose normal.      Mouth/Throat:      Mouth: Mucous membranes are moist.      Pharynx: Oropharynx is clear.   Eyes:      Conjunctiva/sclera: Conjunctivae normal.      Pupils: Pupils are equal, round, and reactive to light.   Cardiovascular:      Rate and Rhythm: Normal rate and regular rhythm.      Heart sounds: Normal heart sounds.   Pulmonary:      Effort: Pulmonary effort is normal.      Breath sounds: Normal breath sounds.   Abdominal:      General: Abdomen is flat. Bowel sounds are normal.      Palpations: Abdomen is soft.   Musculoskeletal:         General: Normal range of motion.      Cervical back: Normal range of motion and neck supple.   Skin:     General: Skin is warm.      Capillary Refill: Capillary refill takes less than 2 seconds.   Neurological:      General: No focal deficit present.      Mental Status: She is alert and oriented to person, place, and time. Mental status is at baseline.   Psychiatric:         Mood and Affect: Mood normal.         Behavior: Behavior normal.         Thought Content: Thought content normal.         Judgment: Judgment normal.          Sarah Delgado MD  [facility]

## 2024-04-13 ENCOUNTER — APPOINTMENT (OUTPATIENT)
Dept: LAB | Facility: HOSPITAL | Age: 54
End: 2024-04-13
Attending: FAMILY MEDICINE
Payer: COMMERCIAL

## 2024-04-13 DIAGNOSIS — I10 PRIMARY HYPERTENSION: ICD-10-CM

## 2024-04-13 LAB
ANION GAP SERPL CALCULATED.3IONS-SCNC: 6 MMOL/L (ref 4–13)
BUN SERPL-MCNC: 24 MG/DL (ref 5–25)
CALCIUM SERPL-MCNC: 8.9 MG/DL (ref 8.4–10.2)
CHLORIDE SERPL-SCNC: 105 MMOL/L (ref 96–108)
CO2 SERPL-SCNC: 28 MMOL/L (ref 21–32)
CREAT SERPL-MCNC: 0.94 MG/DL (ref 0.6–1.3)
GFR SERPL CREATININE-BSD FRML MDRD: 69 ML/MIN/1.73SQ M
GLUCOSE P FAST SERPL-MCNC: 107 MG/DL (ref 65–99)
POTASSIUM SERPL-SCNC: 4.6 MMOL/L (ref 3.5–5.3)
SODIUM SERPL-SCNC: 139 MMOL/L (ref 135–147)

## 2024-04-13 PROCEDURE — 36415 COLL VENOUS BLD VENIPUNCTURE: CPT

## 2024-04-13 PROCEDURE — 80048 BASIC METABOLIC PNL TOTAL CA: CPT

## 2024-04-15 ENCOUNTER — TELEPHONE (OUTPATIENT)
Dept: FAMILY MEDICINE CLINIC | Facility: CLINIC | Age: 54
End: 2024-04-15

## 2024-04-15 NOTE — TELEPHONE ENCOUNTER
----- Message from Sarah Taylor MD sent at 4/14/2024  9:55 PM EDT -----  Please advise patient lab came back normal. No concerns. Just be cautious with blood sugars. I know that she wasn't fasting,

## 2024-05-02 ENCOUNTER — OFFICE VISIT (OUTPATIENT)
Dept: GASTROENTEROLOGY | Facility: CLINIC | Age: 54
End: 2024-05-02
Payer: COMMERCIAL

## 2024-05-02 VITALS
HEART RATE: 66 BPM | HEIGHT: 65 IN | SYSTOLIC BLOOD PRESSURE: 94 MMHG | WEIGHT: 214 LBS | DIASTOLIC BLOOD PRESSURE: 63 MMHG | BODY MASS INDEX: 35.65 KG/M2

## 2024-05-02 DIAGNOSIS — K21.9 GASTROESOPHAGEAL REFLUX DISEASE, UNSPECIFIED WHETHER ESOPHAGITIS PRESENT: ICD-10-CM

## 2024-05-02 DIAGNOSIS — K20.0 EOSINOPHILIC ESOPHAGITIS: Primary | ICD-10-CM

## 2024-05-02 PROCEDURE — 99213 OFFICE O/P EST LOW 20 MIN: CPT | Performed by: INTERNAL MEDICINE

## 2024-05-02 PROCEDURE — 3074F SYST BP LT 130 MM HG: CPT | Performed by: INTERNAL MEDICINE

## 2024-05-02 PROCEDURE — 3078F DIAST BP <80 MM HG: CPT | Performed by: INTERNAL MEDICINE

## 2024-05-02 RX ORDER — OMEPRAZOLE 40 MG/1
40 CAPSULE, DELAYED RELEASE ORAL DAILY
Qty: 90 CAPSULE | Refills: 1 | Status: SHIPPED | OUTPATIENT
Start: 2024-05-02

## 2024-05-02 NOTE — PROGRESS NOTES
[facility] - Outpatient Follow-up Note  Sandrita Phillip 54 y.o. female MRN: [id number]  Encounter: [id number]          ASSESSMENT AND PLAN:      1. Eosinophilic esophagitis  2. Gastroesophageal reflux disease, unspecified whether esophagitis present    Will plan to continue treatment with PPI but switch from pantoprazole to omeprazole to hopefully better control her reflux symptoms.  Will plan reevaluation with EGD at the end of the year when she will hopefully be off of Brilinta    - omeprazole (PriLOSEC) 40 MG capsule; Take 1 capsule (40 mg total) by mouth daily  Dispense: 90 capsule; Refill: 1  - EGD; Future        ______________________________________________________________________    SUBJECTIVE: Patient with a history of eosinophilic esophagitis with persistent but improved eosinophilia on repeat EGD with treatment with PPI alone.  Currently feeling well with no dysphagia but does have persistent heartburn and reflux despite pantoprazole 40 mg daily.  This is especially frequent at nighttime.  Eats dinner about 2 hours or so before bed.    REVIEW OF SYSTEMS:    ROS no significant dysphagia, odynophagia, nausea vomiting, fever or chills, jaundice or rash      Historical Information   Past Medical History:   Diagnosis Date    Allergic     seasonal    Asthma     very rarely    Colon polyp     GERD (gastroesophageal reflux disease) 10+ years ago    Hypertension     Obesity     Patient denies medical problems     PONV (postoperative nausea and vomiting)      Past Surgical History:   Procedure Laterality Date    ADENOIDECTOMY       SECTION      x1    COLONOSCOPY      TONSILLECTOMY       Social History   Social History     Substance and Sexual Activity   Alcohol Use Yes    Comment: socially     Social History     Substance and Sexual Activity   Drug Use Never     Social History     Tobacco Use   Smoking Status Former    Current packs/day: 0.00    Average packs/day: 0.5 packs/day for  "15.0 years (7.5 ttl pk-yrs)    Types: Cigarettes    Start date: 1986    Quit date: 2001    Years since quittin.0   Smokeless Tobacco Never     Family History   Problem Relation Age of Onset    Breast cancer Mother 68    Hypertension Mother     Melanoma Mother 67    Coronary artery disease Mother     Arthritis Mother     Heart disease Father          at 70    Hyperlipidemia Father     Prostate cancer Father 65    Diabetes Father        Meds/Allergies       Current Outpatient Medications:     Aspir-Low 81 MG EC tablet    atorvastatin (LIPITOR) 80 mg tablet    Calcium 500 MG tablet    carvedilol (COREG) 3.125 mg tablet    Entresto 24-26 MG TABS    ezetimibe (ZETIA) 10 mg tablet    Jardiance 10 MG TABS tablet    multivitamin (THERAGRAN) TABS    omeprazole (PriLOSEC) 40 MG capsule    Spironolactone 25 MG/5ML SUSP    ticagrelor (Brilinta) 90 MG    Allergies   Allergen Reactions    Strawberry Extract - Food Allergy Other (See Comments)           Objective     Blood pressure 94/63, pulse 66, height 5' 5\" (1.651 m), weight 97.1 kg (214 lb), last menstrual period 2022. Body mass index is 35.61 kg/m².      PHYSICAL EXAM:      Physical Exam     Lab Results:   No visits with results within 1 Day(s) from this visit.   Latest known visit with results is:   Appointment on 2024   Component Date Value    Sodium 2024 139     Potassium 2024 4.6     Chloride 2024 105     CO2 2024 28     ANION GAP 2024 6     BUN 2024 24     Creatinine 2024 0.94     Glucose, Fasting 2024 107 (H)     Calcium 2024 8.9     eGFR 2024 69          Radiology Results:   No results found.  "

## 2024-05-02 NOTE — PATIENT INSTRUCTIONS
Scheduled date of EGD(as of today):10/15/24  Physician performing EGD:Dr. Ponce  Location of EGD:[facility]  Instructions reviewed with patient by:ti  Clearances:  n/a-pt will be stopping bld thinner

## 2024-05-03 ENCOUNTER — TELEPHONE (OUTPATIENT)
Age: 54
End: 2024-05-03

## 2024-06-03 ENCOUNTER — OFFICE VISIT (OUTPATIENT)
Dept: OBGYN CLINIC | Facility: CLINIC | Age: 54
End: 2024-06-03
Payer: COMMERCIAL

## 2024-06-03 ENCOUNTER — APPOINTMENT (OUTPATIENT)
Dept: RADIOLOGY | Facility: CLINIC | Age: 54
End: 2024-06-03
Payer: COMMERCIAL

## 2024-06-03 VITALS
BODY MASS INDEX: 35.65 KG/M2 | HEIGHT: 65 IN | WEIGHT: 214 LBS | DIASTOLIC BLOOD PRESSURE: 73 MMHG | SYSTOLIC BLOOD PRESSURE: 126 MMHG | HEART RATE: 58 BPM

## 2024-06-03 DIAGNOSIS — M25.512 LEFT SHOULDER PAIN, UNSPECIFIED CHRONICITY: ICD-10-CM

## 2024-06-03 DIAGNOSIS — M75.42 SUBACROMIAL IMPINGEMENT, LEFT: Primary | ICD-10-CM

## 2024-06-03 DIAGNOSIS — M75.82 ROTATOR CUFF TENDINITIS, LEFT: ICD-10-CM

## 2024-06-03 PROCEDURE — 99213 OFFICE O/P EST LOW 20 MIN: CPT | Performed by: ORTHOPAEDIC SURGERY

## 2024-06-03 PROCEDURE — 73030 X-RAY EXAM OF SHOULDER: CPT

## 2024-06-03 NOTE — PROGRESS NOTES
Orthopedic Sports Medicine New Patient Visit     Assesment:   54 y.o. female with left subacromial impingement, left rotator cuff tendinitis    Plan:    Upon examination today, the patient has well-maintained shoulder motion and strength such that I believe her rotator cuff function remains intact. Given her range of motion and strength on exam today in the setting of atraumatic shoulder pain, I recommended beginning a course of formal PT that focuses on shoulder mobility, strength, and stabilization. We also discussed cosndering steroid injections in the future if symptoms persist, however, I do not believe this is necessary at this time as she has minimal pain elicited on exam today. We will plan to follow up in 6 weeks if the patient's symptoms persist or worsen. We can consider need for further investigation of the shoulder, including MRI, at that time if necessary. In the meantime, can use ice/heat and OTC medications as needed for pain. Continue activities as tolerated, using pain as a guide.          History of Present Illness:    The patient is a 54 y.o., right hand dominant, female, presenting for initial evaluation of atraumatic left shoulder pain localized deep within the joint for about 7 months. The patient reports pain has been gradually worsening over this time. She notes pain with cross body adduction and getting dressed. She reports a history of shoulder trauma after falling off a horse in her 20s, but she denies any history of fracture or dislocation related to this event. The patient denies any recent trauma, neck pain, numbness/tingling, or weakness. The patient denies any treatment for the shoulder so far.    Shoulder Surgical History:  None    Past Medical, Social and Family History:  Past Medical History:   Diagnosis Date    Allergic     seasonal    Asthma     very rarely    Colon polyp     GERD (gastroesophageal reflux disease) 10+ years ago    Hypertension     Obesity     Patient denies  medical problems     PONV (postoperative nausea and vomiting)      Past Surgical History:   Procedure Laterality Date    ADENOIDECTOMY       SECTION      x1    COLONOSCOPY      TONSILLECTOMY       Allergies   Allergen Reactions    Strawberry Extract - Food Allergy Other (See Comments)     Current Outpatient Medications on File Prior to Visit   Medication Sig Dispense Refill    Aspir-Low 81 MG EC tablet       atorvastatin (LIPITOR) 80 mg tablet       Calcium 500 MG tablet Take 500 mg by mouth      carvedilol (COREG) 3.125 mg tablet       Entresto 24-26 MG TABS Take by mouth in the morning      ezetimibe (ZETIA) 10 mg tablet Take 10 mg by mouth daily      Jardiance 10 MG TABS tablet Take 10 mg by mouth daily      multivitamin (THERAGRAN) TABS Take 1 tablet by mouth daily      omeprazole (PriLOSEC) 40 MG capsule Take 1 capsule (40 mg total) by mouth daily 90 capsule 1    Spironolactone 25 MG/5ML SUSP Take 25 mg by mouth in the morning Taking 25mg tablet      ticagrelor (Brilinta) 90 MG Take 90 mg by mouth 2 (two) times a day       No current facility-administered medications on file prior to visit.     Social History     Socioeconomic History    Marital status: Single     Spouse name: Not on file    Number of children: Not on file    Years of education: Not on file    Highest education level: Not on file   Occupational History    Not on file   Tobacco Use    Smoking status: Former     Current packs/day: 0.00     Average packs/day: 0.5 packs/day for 15.0 years (7.5 ttl pk-yrs)     Types: Cigarettes     Start date: 1986     Quit date: 2001     Years since quittin.1    Smokeless tobacco: Never   Vaping Use    Vaping status: Never Used   Substance and Sexual Activity    Alcohol use: Yes     Comment: socially    Drug use: Never    Sexual activity: Yes     Partners: Male     Birth control/protection: Condom Male   Other Topics Concern    Not on file   Social History Narrative    Not on file     Social  "Determinants of Health     Financial Resource Strain: Not on file   Food Insecurity: Not on file   Transportation Needs: Not on file   Physical Activity: Not on file   Stress: Not on file   Social Connections: Not on file   Intimate Partner Violence: Not on file   Housing Stability: Not on file         I have reviewed the past medical, surgical, social and family history, medications and allergies as documented in the EMR.    Review of systems: ROS is negative other than that noted in the HPI.  Constitutional: Negative for fatigue and fever.   HENT: Negative for sore throat.    Respiratory: Negative for shortness of breath.    Cardiovascular: Negative for chest pain.   Gastrointestinal: Negative for abdominal pain.   Endocrine: Negative for cold intolerance and heat intolerance.   Genitourinary: Negative for flank pain.   Musculoskeletal: Negative for back pain.   Skin: Negative for rash.   Allergic/Immunologic: Negative for immunocompromised state.   Neurological: Negative for dizziness.   Psychiatric/Behavioral: Negative for agitation.      Physical Exam:    Blood pressure 126/73, pulse 58, height 5' 5\" (1.651 m), weight 97.1 kg (214 lb), last menstrual period 07/13/2022.    General/Constitutional: NAD, well developed, well nourished  HENT: Normocephalic, atraumatic  CV: Intact distal pulses, regular rate  Resp: No respiratory distress or labored breathing  Abdomen: soft, nondistended, non tender   Lymphatic: No lymphadenopathy palpated  Neuro: Alert and Oriented x 3, no focal deficits  Psych: Normal mood, normal affect  Skin: Warm, dry, no rashes, no erythema      Shoulder Exam:      Inspection: No ecchymosis, edema, or deformity. No visualized wounds or skin lesions   Palpation: No AC joint tenderness. Mild bicipital groove tenderness  Active Motion:       FF: 170, symmetric        ER: 80 with minimal pain, symmetric        IR: T12 with minimal pain, symmetric  Strength: 5/5 empty can, 5/5 ER,  5/5 IR   Sensory " - SILT in the Radial / Ulnar / Median / Axillary nerve distributions  Motor - AIN / PIN / Radial / Ulnar / Median / Axillary motor nerves in tact  Palpable Radial pulse  Cap refill <2secs in all digits    + Guzman        Imaging    I reviewed and interpreted X-rays of the left shoulder which show no acute osseous abnormalities. Mild AC and no significant GH joint degenerative changes. Humerus is well-centered on the glenoid.

## 2024-07-17 ENCOUNTER — APPOINTMENT (OUTPATIENT)
Dept: LAB | Facility: HOSPITAL | Age: 54
End: 2024-07-17
Payer: COMMERCIAL

## 2024-07-17 DIAGNOSIS — E78.00 PURE HYPERCHOLESTEROLEMIA: ICD-10-CM

## 2024-07-17 DIAGNOSIS — Z00.00 ROUTINE GENERAL MEDICAL EXAMINATION AT A HEALTH CARE FACILITY: ICD-10-CM

## 2024-07-17 DIAGNOSIS — I10 HYPERTENSION, UNSPECIFIED TYPE: ICD-10-CM

## 2024-07-17 DIAGNOSIS — I42.0 CONGESTIVE CARDIOMYOPATHY (HCC): ICD-10-CM

## 2024-07-17 LAB
CHOLEST SERPL-MCNC: 146 MG/DL
CK SERPL-CCNC: 86 U/L (ref 26–192)
CRP SERPL QL: 10.8 MG/L
HCYS SERPL-SCNC: 9.7 UMOL/L (ref 5–15)
HDLC SERPL-MCNC: 61 MG/DL
LDLC SERPL CALC-MCNC: 65 MG/DL (ref 0–100)
NONHDLC SERPL-MCNC: 85 MG/DL
PROLACTIN SERPL-MCNC: 7.06 NG/ML (ref 2.74–19.64)
T4 SERPL-MCNC: 8.08 UG/DL (ref 6.09–12.23)
TRIGL SERPL-MCNC: 100 MG/DL
TSH SERPL DL<=0.05 MIU/L-ACNC: 1.48 UIU/ML (ref 0.45–4.5)

## 2024-07-17 PROCEDURE — 82550 ASSAY OF CK (CPK): CPT

## 2024-07-17 PROCEDURE — 36415 COLL VENOUS BLD VENIPUNCTURE: CPT

## 2024-07-17 PROCEDURE — 83695 ASSAY OF LIPOPROTEIN(A): CPT

## 2024-07-17 PROCEDURE — 84443 ASSAY THYROID STIM HORMONE: CPT

## 2024-07-17 PROCEDURE — 84146 ASSAY OF PROLACTIN: CPT

## 2024-07-17 PROCEDURE — 80061 LIPID PANEL: CPT

## 2024-07-17 PROCEDURE — 86140 C-REACTIVE PROTEIN: CPT

## 2024-07-17 PROCEDURE — 84436 ASSAY OF TOTAL THYROXINE: CPT

## 2024-07-17 PROCEDURE — 83090 ASSAY OF HOMOCYSTEINE: CPT

## 2024-07-18 LAB — LPA SERPL-SCNC: 294.9 NMOL/L

## 2024-07-26 ENCOUNTER — TELEPHONE (OUTPATIENT)
Age: 54
End: 2024-07-26

## 2024-07-26 DIAGNOSIS — I10 PRIMARY HYPERTENSION: Primary | ICD-10-CM

## 2024-07-26 RX ORDER — CARVEDILOL 3.12 MG/1
3.12 TABLET ORAL 2 TIMES DAILY WITH MEALS
Qty: 60 TABLET | Refills: 8 | Status: SHIPPED | OUTPATIENT
Start: 2024-07-26 | End: 2025-04-22

## 2024-07-26 NOTE — TELEPHONE ENCOUNTER
Sandrita called back to confirm the dose for her  blood pressure medication. It is 3.2 twice a day. Please Advise Thank you She lost connection and just wanted to confirm with Sofi

## 2024-07-26 NOTE — TELEPHONE ENCOUNTER
Pt called in stating she is completely out of her BP medication and is requesting if her PCP can refill this medication for her. Pt states the medication is prescribed by her cardiologist but he never refilled the medication and has left the office for the weekend. Pt states she will be leaving for vacation this weekend. Spoke with Sofi [facility] and she stated she will send a message to PCP requesting a refill but there is no guarantee. Pt made aware and understood.

## 2024-07-26 NOTE — TELEPHONE ENCOUNTER
Pt is requesting a refill on bp med.  Her cardiologist's office was supposed to refill but did not and are closed for the day.  She leaves for vacation this weekend.

## 2024-08-01 ENCOUNTER — OFFICE VISIT (OUTPATIENT)
Dept: OBGYN CLINIC | Facility: CLINIC | Age: 54
End: 2024-08-01
Payer: COMMERCIAL

## 2024-08-01 VITALS
DIASTOLIC BLOOD PRESSURE: 70 MMHG | HEIGHT: 65 IN | WEIGHT: 222 LBS | BODY MASS INDEX: 36.99 KG/M2 | SYSTOLIC BLOOD PRESSURE: 120 MMHG

## 2024-08-01 DIAGNOSIS — Z01.419 ENCOUNTER FOR GYNECOLOGICAL EXAMINATION WITHOUT ABNORMAL FINDING: Primary | ICD-10-CM

## 2024-08-01 DIAGNOSIS — Z12.31 ENCOUNTER FOR SCREENING MAMMOGRAM FOR MALIGNANT NEOPLASM OF BREAST: ICD-10-CM

## 2024-08-01 DIAGNOSIS — Z00.00 PHYSICAL EXAM: ICD-10-CM

## 2024-08-01 PROCEDURE — 99386 PREV VISIT NEW AGE 40-64: CPT | Performed by: OBSTETRICS & GYNECOLOGY

## 2024-08-01 PROCEDURE — G0476 HPV COMBO ASSAY CA SCREEN: HCPCS | Performed by: OBSTETRICS & GYNECOLOGY

## 2024-08-01 PROCEDURE — 3078F DIAST BP <80 MM HG: CPT | Performed by: OBSTETRICS & GYNECOLOGY

## 2024-08-01 PROCEDURE — 3074F SYST BP LT 130 MM HG: CPT | Performed by: OBSTETRICS & GYNECOLOGY

## 2024-08-01 PROCEDURE — G0145 SCR C/V CYTO,THINLAYER,RESCR: HCPCS | Performed by: OBSTETRICS & GYNECOLOGY

## 2024-08-01 NOTE — PROGRESS NOTES
Subjective      Sandrita Phillip is a 54 y.o. G3, P1 female who presents for new patient annual well woman exam.  Patient reports last menstrual period was 2023.  Patient has family history on her father side of cardiac disease and had a blockage of her LAD with myocardial infarction and stent placement 2023 she has had good recovery with cardiac rehab and her recent ejection fraction has improved from 23-65.  Her son was IVF with donor egg and does not share her family history risks.  Patient reports no bleeding, spotting, or discharge.  Patient reports No hot flashes/night sweats, Yes mild pain problems intercourse, Yes  vaginal dryness reviewed additional options for lubrication, sleeping well but with diuretics weeks to void 2-3 times at night usually able to go back to sleep.  Son is 8 and doing well her mother recently moved in with them as she is having more health issues.    Current contraception: post menopausal status  History of abnormal Pap smear: no  Family history of uterine or ovarian cancer: no  Regular self breast exam: yes  History of abnormal mammogram: no  Family history of breast cancer: yes -mother in her 70s    Menstrual History:  OB History          3    Para   1    Term   1            AB        Living             SAB        IAB        Ectopic        Multiple        Live Births   1                Menarche age: 13  Patient's last menstrual period was 2022 (approximate).       Past Medical History:   Diagnosis Date    Allergic     seasonal    Asthma     very rarely    Colon polyp     GERD (gastroesophageal reflux disease) 10+ years ago    Hypertension     Obesity     Patient denies medical problems     PONV (postoperative nausea and vomiting)      Past Surgical History:   Procedure Laterality Date    ADENOIDECTOMY       SECTION      x1    COLONOSCOPY      TONSILLECTOMY       OB History          3    Para   1    Term   1            AB         "Living             SAB        IAB        Ectopic        Multiple        Live Births   1               Current Outpatient Medications   Medication Instructions    Aspir-Low 81 MG EC tablet     atorvastatin (LIPITOR) 80 mg tablet     Calcium 500 mg, Oral    carvedilol (COREG) 3.125 mg, Oral, 2 times daily with meals    Entresto 24-26 MG TABS Oral, Daily    ezetimibe (ZETIA) 10 mg, Oral, Daily    Jardiance 10 mg, Oral, Daily    multivitamin (THERAGRAN) TABS 1 tablet, Oral, Daily    omeprazole (PRILOSEC) 40 mg, Oral, Daily    Spironolactone 25 mg, Oral, Daily, Taking 25mg tablet     ticagrelor (BRILINTA) 90 mg, Oral, 2 times daily     Allergies   Allergen Reactions    Strawberry Extract - Food Allergy Other (See Comments)     Social History     Tobacco Use    Smoking status: Former     Current packs/day: 0.00     Average packs/day: 0.5 packs/day for 15.0 years (7.5 ttl pk-yrs)     Types: Cigarettes     Start date: 1986     Quit date: 2001     Years since quittin.3    Smokeless tobacco: Never   Vaping Use    Vaping status: Never Used   Substance Use Topics    Alcohol use: Yes     Comment: socially    Drug use: Never       Review of Systems  Review of Systems   Constitutional:  Negative for chills and fever.   HENT:  Negative for ear pain and sore throat.    Eyes:  Negative for pain and visual disturbance.   Respiratory:  Negative for cough and shortness of breath.    Cardiovascular:  Negative for chest pain and palpitations.   Gastrointestinal:  Negative for abdominal pain and vomiting.   Genitourinary:  Negative for dysuria and hematuria.   Musculoskeletal:  Negative for arthralgias and back pain.   Skin:  Negative for color change and rash.   Neurological:  Negative for seizures and syncope.   All other systems reviewed and are negative.    Objective     Vitals:    24 0756   BP: 120/70   BP Location: Right arm   Patient Position: Sitting   Cuff Size: Large   Weight: 101 kg (222 lb)   Height: 5' 5\" " (1.651 m)     General:   alert and oriented, in no acute distress   Heart: regular rate and rhythm, S1, S2 normal, no murmur, click, rub or gallop   Lungs: clear to auscultation bilaterally   Abdomen: soft, non-tender, without masses or organomegaly   Vulva: normal   Vagina: normal mucosa, atrophic changes   Cervix: no bleeding following Pap, no cervical motion tenderness, and no lesions   Uterus: normal size, mobile, non-tender   Adnexa: normal adnexa and no mass, fullness, tenderness   Breast inspection negative, no nipple discharge or bleeding, no masses or nodularity palpable  Rectal deferred, normal colonoscopy last year good for 10 years, had upper endoscopy with gastritis also has eosinophilic esophagitis  Thyroid normal no masses or nodules     Assessment   54-year-old G3, P1 here for new patient annual exam reports last Pap normal about 2 to 3 years ago, normal mammogram March 2024, last colonoscopy March 2023 good for 10 years     Plan   ThinPrep with cotesting performed, given slip for mammogram for next March return in 1 year or sooner as needed

## 2024-08-02 LAB
HPV HR 12 DNA CVX QL NAA+PROBE: NEGATIVE
HPV16 DNA CVX QL NAA+PROBE: NEGATIVE
HPV18 DNA CVX QL NAA+PROBE: NEGATIVE

## 2024-08-07 LAB
LAB AP GYN PRIMARY INTERPRETATION: NORMAL
Lab: NORMAL

## 2024-09-13 ENCOUNTER — OFFICE VISIT (OUTPATIENT)
Dept: GASTROENTEROLOGY | Facility: CLINIC | Age: 54
End: 2024-09-13
Payer: COMMERCIAL

## 2024-09-13 VITALS
HEART RATE: 59 BPM | DIASTOLIC BLOOD PRESSURE: 59 MMHG | SYSTOLIC BLOOD PRESSURE: 111 MMHG | BODY MASS INDEX: 37.49 KG/M2 | WEIGHT: 225 LBS | HEIGHT: 65 IN

## 2024-09-13 DIAGNOSIS — K20.0 EOSINOPHILIC ESOPHAGITIS: Primary | ICD-10-CM

## 2024-09-13 DIAGNOSIS — K21.9 GASTROESOPHAGEAL REFLUX DISEASE, UNSPECIFIED WHETHER ESOPHAGITIS PRESENT: ICD-10-CM

## 2024-09-13 PROCEDURE — 99214 OFFICE O/P EST MOD 30 MIN: CPT | Performed by: NURSE PRACTITIONER

## 2024-09-13 RX ORDER — SPIRONOLACTONE 25 MG/1
25 TABLET ORAL DAILY
COMMUNITY
Start: 2024-07-02

## 2024-09-13 RX ORDER — FAMOTIDINE 40 MG/1
40 TABLET, FILM COATED ORAL
Qty: 30 TABLET | Refills: 1 | Status: SHIPPED | OUTPATIENT
Start: 2024-09-13

## 2024-09-13 RX ORDER — PANTOPRAZOLE SODIUM 40 MG/1
40 TABLET, DELAYED RELEASE ORAL DAILY
Qty: 90 TABLET | Refills: 1 | Status: SHIPPED | OUTPATIENT
Start: 2024-09-13 | End: 2024-09-20 | Stop reason: SDUPTHER

## 2024-09-13 RX ORDER — PANTOPRAZOLE SODIUM 40 MG/1
40 TABLET, DELAYED RELEASE ORAL DAILY
COMMUNITY
Start: 2024-08-25 | End: 2024-09-13 | Stop reason: SDUPTHER

## 2024-09-13 NOTE — PROGRESS NOTES
[facility] [place] - Outpatient Follow-up Note  Sandrita Phillip 54 y.o. female MRN: [id number]  Encounter: [id number]          ASSESSMENT AND PLAN:      1. Eosinophilic esophagitis  2. Gastroesophageal reflux disease, unspecified whether esophagitis present  Pt with increased symptoms after switching to Omeprazole, so switched back to Pantoprazole a few weeks ago and now reports reflux symptoms only when eating too late at night or having a glass of red wine. She denies any dysphagia or odynophagia. She has allergies to strawberry and possible chick peas which she avoids. She had persistent inflammation from EoE on last EGD in Oct 2023 but # of eosinophils improved.     -Continue Pantoprazole, add Pepcid daily at HS  -EGD planned for later this fall when able to come off Brilinta for re-evaluation  -Referral to allergy placed to identify any other potential triggers  -Long term side effect profile of PPI discussed  -     famotidine (PEPCID) 40 MG tablet; Take 1 tablet (40 mg total) by mouth daily at bedtime  -     pantoprazole (PROTONIX) 40 mg tablet; Take 1 tablet (40 mg total) by mouth daily  -     Ambulatory Referral to Allergy; Future      ______________________________________________________________________    SUBJECTIVE:  Sandrita Phillip is a 54 y.o. female with history of CAD on Brilinta, EoE, and GERD presenting for follow up. At last visit in May, she switched from Pantoprazole to Nexium after trying it for 3 months because she felt it wasn't working as well. She has now been back on Pantoprazole for the last couple weeks. Reflux symptoms have improved, she now has nighttime symptoms only when eating too late at night or having red wine at night. Denies any dysphagia or odynophagia. Allergic to strawberries, possibly chick peas.       REVIEW OF SYSTEMS IS OTHERWISE NEGATIVE.      Historical Information   Past Medical History:   Diagnosis Date    Allergic     seasonal    Asthma     very rarely  "   Colon polyp     GERD (gastroesophageal reflux disease) 10+ years ago    Hypertension     Myocardial infarction (HCC)     Obesity     Patient denies medical problems     PONV (postoperative nausea and vomiting)      Past Surgical History:   Procedure Laterality Date    ADENOIDECTOMY       SECTION      x1    COLONOSCOPY      CORONARY ANGIOPLASTY WITH STENT PLACEMENT      TONSILLECTOMY       Social History   Social History     Substance and Sexual Activity   Alcohol Use Yes    Comment: socially     Social History     Substance and Sexual Activity   Drug Use Never     Social History     Tobacco Use   Smoking Status Former    Current packs/day: 0.00    Average packs/day: 0.5 packs/day for 15.0 years (7.5 ttl pk-yrs)    Types: Cigarettes    Start date: 1986    Quit date: 2001    Years since quittin.4   Smokeless Tobacco Never     Family History   Problem Relation Age of Onset    Breast cancer Mother 68    Hypertension Mother     Melanoma Mother 67    Coronary artery disease Mother     Arthritis Mother     Heart disease Father          at 70    Hyperlipidemia Father     Prostate cancer Father 65    Diabetes Father        Meds/Allergies       Current Outpatient Medications:     Aspir-Low 81 MG EC tablet    atorvastatin (LIPITOR) 80 mg tablet    Calcium 500 MG tablet    carvedilol (COREG) 3.125 mg tablet    Entresto 24-26 MG TABS    ezetimibe (ZETIA) 10 mg tablet    famotidine (PEPCID) 40 MG tablet    Jardiance 10 MG TABS tablet    multivitamin (THERAGRAN) TABS    pantoprazole (PROTONIX) 40 mg tablet    spironolactone (ALDACTONE) 25 mg tablet    ticagrelor (Brilinta) 90 MG    Allergies   Allergen Reactions    Strawberry Extract - Food Allergy Other (See Comments)           Objective     Blood pressure 111/59, pulse 59, height 5' 5\" (1.651 m), weight 102 kg (225 lb), last menstrual period 2022. Body mass index is 37.44 kg/m².      PHYSICAL EXAM:      General Appearance:   Alert, " cooperative, no distress   HEENT:   Normocephalic, atraumatic, anicteric.     Neck:  Supple, symmetrical, trachea midline   Lungs:   Clear to auscultation bilaterally; no rales, rhonchi or wheezing; respirations unlabored    Heart::   Regular rate and rhythm; no murmur.   Abdomen:   Soft, non-tender, non-distended; normal bowel sounds; no masses, no organomegaly    Genitalia:   Deferred    Rectal:   Deferred    Extremities:  No cyanosis, clubbing or edema    Skin:  No jaundice, rashes, or lesions    Lymph nodes:  No palpable cervical lymphadenopathy        Lab Results:   No visits with results within 1 Day(s) from this visit.   Latest known visit with results is:   Office Visit on 08/01/2024   Component Date Value    Case Report 08/01/2024                      Value:Gynecologic Cytology Report                       Case: [id number]                                  Authorizing Provider:  Lily Silva MD         Collected:           08/01/2024 0854              Ordering Location:     [facility] Received:            08/01/2024 0854                                     OB/[facility] [place]                                                                First Screen:          Divina Ambrocio, CT                                                         Specimen:    LIQUID-BASED PAP, SCREENING, Cervix, Endocervical                                          Primary Interpretation 08/01/2024 Negative for intraepithelial lesion or malignancy     Specimen Adequacy 08/01/2024 Satisfactory for evaluation. Absence of endocervical/transformation zone component.     Additional Information 08/01/2024                      Value:[employer]'s FDA approved ,  and ThinPrep Imaging Duo System are utilized with strict adherence to the 's instruction manual to prepare gynecologic and non-gynecologic cytology specimens for the production of ThinPrep slides as well as for gynecologic ThinPrep imaging. These  processes have been validated by our laboratory and/or by the .  The Pap test is not a diagnostic procedure and should not be used as the sole means to detect cervical cancer. It is only a screening procedure to aid in the detection of cervical cancer and its precursors. Both false-negative and false-positive results have been experienced. Your patient's test result should be interpreted in this context together with the history and clinical findings.      Gross Description 08/01/2024                      Value:20 ml , colorless, cloudy received in a ThinPrep vial.      LMP 08/01/2024      HPV Other HR 08/01/2024 Negative     HPV16 08/01/2024 Negative     HPV18 08/01/2024 Negative          Radiology Results:   No results found.

## 2024-09-20 DIAGNOSIS — K21.9 GASTROESOPHAGEAL REFLUX DISEASE, UNSPECIFIED WHETHER ESOPHAGITIS PRESENT: ICD-10-CM

## 2024-09-20 RX ORDER — PANTOPRAZOLE SODIUM 40 MG/1
40 TABLET, DELAYED RELEASE ORAL DAILY
Qty: 90 TABLET | Refills: 1 | Status: SHIPPED | OUTPATIENT
Start: 2024-09-20 | End: 2025-03-19

## 2024-09-20 NOTE — TELEPHONE ENCOUNTER
Pharmacy says they never received script.    Reason for call:   [x] Refill   [] Prior Auth  [] Other:     Office:   [] PCP/Provider -   [x] Specialty/Provider - Gastro/ALTAGRACIA Buenrostro     Medication: pantoprazole (PROTONIX) 40 mg tablet     Dose/Frequency: Take 1 tablet (40 mg total) by mouth daily     Quantity: 90    Pharmacy: [facility]/pharmacy #[id number] [address]     Does the patient have enough for 3 days?   [x] Yes   [] No - Send as HP to POD

## 2024-09-26 NOTE — TELEPHONE ENCOUNTER
Patient called to check the status of the refill. Patient made aware that it was sent to the pharmacy. Patient will contact the pharmacy.

## 2024-10-01 ENCOUNTER — ANESTHESIA (OUTPATIENT)
Dept: ANESTHESIOLOGY | Facility: HOSPITAL | Age: 54
End: 2024-10-01

## 2024-10-01 ENCOUNTER — ANESTHESIA EVENT (OUTPATIENT)
Dept: ANESTHESIOLOGY | Facility: HOSPITAL | Age: 54
End: 2024-10-01

## 2024-10-10 ENCOUNTER — TELEPHONE (OUTPATIENT)
Age: 54
End: 2024-10-10

## 2024-10-10 NOTE — TELEPHONE ENCOUNTER
Patients GI provider:  Dr. Ponce    Number to return call: [phone]     Reason for call: [facility] called and stated patient is taking medication Brilinda and there is no clearance on file please review pt is scheduled for an Egd on 10/15/2024 thank you     Scheduled procedure/appointment date if applicable: procedure 10/15/2024

## 2024-10-10 NOTE — TELEPHONE ENCOUNTER
Dr Ponce, ophelia Henriquez hold in chart. Pt to continue aspirin. Pt had one dose of Brilanta on 10/10/24 and will stop it now until after her procedure. Thanks Chayo

## 2024-10-10 NOTE — TELEPHONE ENCOUNTER
Called and spoke with pt. She is due to go off the Brillinta October 12th. She is calling her doctor in [place] to see if she can stop today. When scheduled, thought she would be off in time for the 10/16 procedure and that is why it wasn't requested to stop.  I will call pt around 1pm to see if she was able to stop.

## 2024-10-14 ENCOUNTER — RA CDI HCC (OUTPATIENT)
Dept: OTHER | Facility: HOSPITAL | Age: 54
End: 2024-10-14

## 2024-10-15 ENCOUNTER — ANESTHESIA EVENT (OUTPATIENT)
Dept: GASTROENTEROLOGY | Facility: AMBULARY SURGERY CENTER | Age: 54
End: 2024-10-15
Payer: COMMERCIAL

## 2024-10-15 ENCOUNTER — HOSPITAL ENCOUNTER (OUTPATIENT)
Dept: GASTROENTEROLOGY | Facility: AMBULARY SURGERY CENTER | Age: 54
Setting detail: OUTPATIENT SURGERY
Discharge: HOME | End: 2024-10-15
Attending: INTERNAL MEDICINE
Payer: COMMERCIAL

## 2024-10-15 VITALS
OXYGEN SATURATION: 95 % | HEART RATE: 64 BPM | SYSTOLIC BLOOD PRESSURE: 95 MMHG | RESPIRATION RATE: 16 BRPM | DIASTOLIC BLOOD PRESSURE: 52 MMHG | TEMPERATURE: 96.5 F

## 2024-10-15 DIAGNOSIS — K20.0 EOSINOPHILIC ESOPHAGITIS: ICD-10-CM

## 2024-10-15 DIAGNOSIS — K21.9 GASTROESOPHAGEAL REFLUX DISEASE, UNSPECIFIED WHETHER ESOPHAGITIS PRESENT: ICD-10-CM

## 2024-10-15 PROCEDURE — 43239 EGD BIOPSY SINGLE/MULTIPLE: CPT | Performed by: INTERNAL MEDICINE

## 2024-10-15 PROCEDURE — 88305 TISSUE EXAM BY PATHOLOGIST: CPT | Performed by: PATHOLOGY

## 2024-10-15 RX ORDER — EPHEDRINE SULFATE 50 MG/ML
INJECTION INTRAVENOUS AS NEEDED
Status: DISCONTINUED | OUTPATIENT
Start: 2024-10-15 | End: 2024-10-15

## 2024-10-15 RX ORDER — ONDANSETRON 2 MG/ML
4 INJECTION INTRAMUSCULAR; INTRAVENOUS ONCE AS NEEDED
Status: CANCELLED | OUTPATIENT
Start: 2024-10-15

## 2024-10-15 RX ORDER — PROPOFOL 10 MG/ML
INJECTION, EMULSION INTRAVENOUS AS NEEDED
Status: DISCONTINUED | OUTPATIENT
Start: 2024-10-15 | End: 2024-10-15

## 2024-10-15 RX ORDER — SODIUM CHLORIDE, SODIUM LACTATE, POTASSIUM CHLORIDE, CALCIUM CHLORIDE 600; 310; 30; 20 MG/100ML; MG/100ML; MG/100ML; MG/100ML
INJECTION, SOLUTION INTRAVENOUS CONTINUOUS PRN
Status: DISCONTINUED | OUTPATIENT
Start: 2024-10-15 | End: 2024-10-15

## 2024-10-15 RX ORDER — GLYCOPYRROLATE 0.2 MG/ML
INJECTION INTRAMUSCULAR; INTRAVENOUS AS NEEDED
Status: DISCONTINUED | OUTPATIENT
Start: 2024-10-15 | End: 2024-10-15

## 2024-10-15 RX ORDER — LIDOCAINE HYDROCHLORIDE 10 MG/ML
INJECTION, SOLUTION EPIDURAL; INFILTRATION; INTRACAUDAL; PERINEURAL AS NEEDED
Status: DISCONTINUED | OUTPATIENT
Start: 2024-10-15 | End: 2024-10-15

## 2024-10-15 RX ADMIN — SODIUM CHLORIDE, SODIUM LACTATE, POTASSIUM CHLORIDE, AND CALCIUM CHLORIDE: .6; .31; .03; .02 INJECTION, SOLUTION INTRAVENOUS at 08:37

## 2024-10-15 RX ADMIN — PROPOFOL 50 MG: 10 INJECTION, EMULSION INTRAVENOUS at 09:16

## 2024-10-15 RX ADMIN — PROPOFOL 150 MG: 10 INJECTION, EMULSION INTRAVENOUS at 09:12

## 2024-10-15 RX ADMIN — EPHEDRINE SULFATE 5 MG: 50 INJECTION, SOLUTION INTRAVENOUS at 09:14

## 2024-10-15 RX ADMIN — PROPOFOL 50 MG: 10 INJECTION, EMULSION INTRAVENOUS at 09:18

## 2024-10-15 RX ADMIN — GLYCOPYRROLATE 0.2 MG: 0.2 INJECTION, SOLUTION INTRAMUSCULAR; INTRAVENOUS at 09:14

## 2024-10-15 RX ADMIN — LIDOCAINE HYDROCHLORIDE 50 MG: 10 INJECTION, SOLUTION EPIDURAL; INFILTRATION; INTRACAUDAL; PERINEURAL at 09:12

## 2024-10-15 NOTE — ANESTHESIA PREPROCEDURE EVALUATION
Procedure:  EGD    Relevant Problems   ANESTHESIA   (+) PONV (postoperative nausea and vomiting)      CARDIO   (+) HTN (hypertension)      ENDO (within normal limits)      GI/HEPATIC   (+) Gastroesophageal reflux disease      PULMONARY (within normal limits)        Physical Exam    Airway    Mallampati score: III  TM Distance: >3 FB  Neck ROM: full     Dental   No notable dental hx     Cardiovascular  Rhythm: regular, Rate: normal    Pulmonary   Breath sounds clear to auscultation    Other Findings  post-pubertal.      Anesthesia Plan  ASA Score- 2     Anesthesia Type- IV sedation with anesthesia with ASA Monitors.         Additional Monitors:     Airway Plan:            Plan Factors-Exercise tolerance (METS): >4 METS.    Chart reviewed.   Existing labs reviewed. Patient summary reviewed.    Patient is not a current smoker.              Induction-     Postoperative Plan-         Informed Consent- Anesthetic plan and risks discussed with patient.  I personally reviewed this patient with the CRNA. Discussed and agreed on the Anesthesia Plan with the CRNA..

## 2024-10-15 NOTE — ANESTHESIA POSTPROCEDURE EVALUATION
Post-Op Assessment Note    CV Status:  Stable  Pain Score: 0    Pain management: adequate       Mental Status:  Sleepy   Hydration Status:  Euvolemic   PONV Controlled:  Controlled   Airway Patency:  Patent     Post Op Vitals Reviewed: Yes    No anethesia notable event occurred.    Staff: Anesthesiologist, CRNA           Last Filed PACU Vitals:  Vitals Value Taken Time   Temp     Pulse 74 10/15/24 0920   /61 10/15/24 0920   Resp 12 10/15/24 0920   SpO2 96 % 10/15/24 0920       Modified Marc:  Activity: 2 (10/15/2024  7:56 AM)  Respiration: 2 (10/15/2024  7:56 AM)  Circulation: 2 (10/15/2024  7:56 AM)  Consciousness: 2 (10/15/2024  7:56 AM)  Oxygen Saturation: 2 (10/15/2024  7:56 AM)  Modified Marc Score: 10 (10/15/2024  7:56 AM)

## 2024-10-15 NOTE — ANESTHESIA POSTPROCEDURE EVALUATION
Post-Op Assessment Note    CV Status:  Stable  Pain Score: 0    Pain management: adequate       Mental Status:  Alert and awake   Hydration Status:  Euvolemic and stable   PONV Controlled:  Controlled   Airway Patency:  Patent     Post Op Vitals Reviewed: Yes    No anethesia notable event occurred.    Staff: Anesthesiologist           Last Filed PACU Vitals:  Vitals Value Taken Time   Temp     Pulse 64 10/15/24 0945   BP 95/52 10/15/24 0945   Resp 16 10/15/24 0945   SpO2 95 % 10/15/24 0945       Modified Marc:  Activity: 2 (10/15/2024  9:28 AM)  Respiration: 2 (10/15/2024  9:28 AM)  Circulation: 2 (10/15/2024  9:28 AM)  Consciousness: 2 (10/15/2024  9:28 AM)  Oxygen Saturation: 2 (10/15/2024  9:28 AM)  Modified Marc Score: 10 (10/15/2024  9:28 AM)

## 2024-10-15 NOTE — H&P
History and Physical - [facility]  Sandrita Phillip 54 y.o. female MRN: [id number]                  HPI: Sandrita Phillip is a 54 y.o. year old female who presents for eosinophilic esophagitis      REVIEW OF SYSTEMS: Per the HPI, and otherwise unremarkable.    Historical Information   Past Medical History:   Diagnosis Date    Allergic     seasonal    Asthma     very rarely    Colon polyp     GERD (gastroesophageal reflux disease) 10+ years ago    Hypertension     Myocardial infarction (HCC)     Obesity     Patient denies medical problems     PONV (postoperative nausea and vomiting)      Past Surgical History:   Procedure Laterality Date    ADENOIDECTOMY       SECTION      x1    COLONOSCOPY      CORONARY ANGIOPLASTY WITH STENT PLACEMENT      TONSILLECTOMY       Social History   Social History     Substance and Sexual Activity   Alcohol Use Yes    Comment: socially     Social History     Substance and Sexual Activity   Drug Use Never     Social History     Tobacco Use   Smoking Status Former    Current packs/day: 0.00    Average packs/day: 0.5 packs/day for 15.0 years (7.5 ttl pk-yrs)    Types: Cigarettes    Start date: 1986    Quit date: 2001    Years since quittin.5   Smokeless Tobacco Never     Family History   Problem Relation Age of Onset    Breast cancer Mother 68    Hypertension Mother     Melanoma Mother 67    Coronary artery disease Mother     Arthritis Mother     Heart disease Father          at 70    Hyperlipidemia Father     Prostate cancer Father 65    Diabetes Father        Meds/Allergies       Current Outpatient Medications:     Aspir-Low 81 MG EC tablet    atorvastatin (LIPITOR) 80 mg tablet    Calcium 500 MG tablet    carvedilol (COREG) 3.125 mg tablet    Entresto 24-26 MG TABS    ezetimibe (ZETIA) 10 mg tablet    famotidine (PEPCID) 40 MG tablet    multivitamin (THERAGRAN) TABS    pantoprazole (PROTONIX) 40 mg tablet    spironolactone (ALDACTONE) 25 mg tablet     Jardiance 10 MG TABS tablet    ticagrelor (Brilinta) 90 MG  No current facility-administered medications for this encounter.    Facility-Administered Medications Ordered in Other Encounters:     lactated ringers infusion, , Intravenous, Continuous PRN, New Bag at 10/15/24 0837    Allergies   Allergen Reactions    Strawberry Extract - Food Allergy Other (See Comments)       Objective     /54   Pulse (!) 53   Temp (!) 96.5 °F (35.8 °C) (Skin)   Resp 18   LMP 07/13/2022 (Approximate)   SpO2 98%       PHYSICAL EXAM    Gen: NAD  Head: NCAT  CV: RRR  CHEST: Clear  ABD: soft, NT/ND  EXT: no edema      ASSESSMENT/PLAN:  This is a 54 y.o. year old female here for EGD, and she is stable and optimized for her procedure.

## 2024-10-17 DIAGNOSIS — K21.9 GASTROESOPHAGEAL REFLUX DISEASE, UNSPECIFIED WHETHER ESOPHAGITIS PRESENT: ICD-10-CM

## 2024-10-17 DIAGNOSIS — K20.0 EOSINOPHILIC ESOPHAGITIS: ICD-10-CM

## 2024-10-17 RX ORDER — FAMOTIDINE 40 MG/1
40 TABLET, FILM COATED ORAL
Qty: 90 TABLET | Refills: 1 | Status: SHIPPED | OUTPATIENT
Start: 2024-10-17

## 2024-10-18 ENCOUNTER — TELEPHONE (OUTPATIENT)
Age: 54
End: 2024-10-18

## 2024-10-18 ENCOUNTER — OFFICE VISIT (OUTPATIENT)
Dept: FAMILY MEDICINE CLINIC | Facility: CLINIC | Age: 54
End: 2024-10-18
Payer: COMMERCIAL

## 2024-10-18 VITALS
TEMPERATURE: 97.7 F | HEART RATE: 75 BPM | DIASTOLIC BLOOD PRESSURE: 78 MMHG | OXYGEN SATURATION: 98 % | BODY MASS INDEX: 37.82 KG/M2 | SYSTOLIC BLOOD PRESSURE: 110 MMHG | HEIGHT: 65 IN | RESPIRATION RATE: 12 BRPM | WEIGHT: 227 LBS

## 2024-10-18 DIAGNOSIS — E66.09 OBESITY DUE TO EXCESS CALORIES, UNSPECIFIED CLASS, UNSPECIFIED WHETHER SERIOUS COMORBIDITY PRESENT: ICD-10-CM

## 2024-10-18 DIAGNOSIS — E78.5 HYPERLIPIDEMIA, UNSPECIFIED HYPERLIPIDEMIA TYPE: ICD-10-CM

## 2024-10-18 DIAGNOSIS — I10 PRIMARY HYPERTENSION: Primary | ICD-10-CM

## 2024-10-18 DIAGNOSIS — Z95.5 HISTORY OF PLACEMENT OF STENT IN LAD CORONARY ARTERY: ICD-10-CM

## 2024-10-18 DIAGNOSIS — R73.09 ABNORMAL GLUCOSE: ICD-10-CM

## 2024-10-18 PROCEDURE — 99214 OFFICE O/P EST MOD 30 MIN: CPT | Performed by: FAMILY MEDICINE

## 2024-10-18 RX ORDER — TIRZEPATIDE 2.5 MG/.5ML
2.5 INJECTION, SOLUTION SUBCUTANEOUS WEEKLY
Qty: 2 ML | Refills: 0 | Status: SHIPPED | OUTPATIENT
Start: 2024-10-18 | End: 2024-10-18 | Stop reason: SDUPTHER

## 2024-10-18 RX ORDER — TIRZEPATIDE 2.5 MG/.5ML
2.5 INJECTION, SOLUTION SUBCUTANEOUS WEEKLY
Qty: 2 ML | Refills: 0 | Status: SHIPPED | OUTPATIENT
Start: 2024-10-18 | End: 2024-11-15

## 2024-10-18 NOTE — PROGRESS NOTES
Sandrita Phillip 1970 female MRN: [id number]    [facility] OFFICE VISIT  [facility] - [facility]      ASSESSMENT/PLAN  Sandrita Phillip is a 54 y.o. female presents to the office for    Diagnoses and all orders for this visit:    Primary hypertension    Hyperlipidemia, unspecified hyperlipidemia type    Abnormal glucose    BMI 50.0-59.9, adult (HCC)    Obesity due to excess calories, unspecified class, unspecified whether serious comorbidity present  -     Discontinue: tirzepatide (Zepbound) 2.5 mg/0.5 mL auto-injector; Inject 0.5 mL (2.5 mg total) under the skin once a week for 28 days  -     tirzepatide (Zepbound) 2.5 mg/0.5 mL auto-injector; Inject 0.5 mL (2.5 mg total) under the skin once a week for 28 days           For the last 6 months the patient has been trying weight loss management by exercising and reducing her calorie intake with no improvement. Patient is actively on a daily basis focusing on her overall health.  I truly believe that the patient would benefit more from Zepbound to help reduce weight along with reducing her calorie diet and exercising about 150 minutes/week. No thyroid cancer; but uncles thyroid disease.   Patient has a history of cardiac disease hyperlipidemia hypertension and abnormal glucose I do believe the patient will be very beneficial weight loss journey.  Closely monitors her cardiologist           Future Appointments   Date Time Provider Department Center   11/26/2024  8:40 AM DO OLIVIA Delgado [facility]   3/18/2025  5:00 PM [address]   4/9/2025  9:20 AM Rod Ponce MD   Practice-Med          SUBJECTIVE  CC: weight management (Patient wants to discuss weight medications)      HPI:  Sandrita Phillip is a 54 y.o. female who presents for an acute appointment.  Would like to discuss about possible weight loss medications.  She feels that this would be beneficial given her cardiac disease.  Will be seeing her  cardiologist next month.  Patient states that she has been taking all her medications as prescribed.  Has been trying lifestyle modifications with no improvement.  Her cardiologist started her on Jardiance for protective of the heart.      Review of Systems   Constitutional:  Negative for activity change, appetite change, chills, fatigue and fever.   HENT:  Negative for congestion.    Respiratory:  Negative for cough, chest tightness and shortness of breath.    Cardiovascular:  Negative for chest pain and leg swelling.   Gastrointestinal:  Negative for abdominal distention, abdominal pain, constipation, diarrhea, nausea and vomiting.   All other systems reviewed and are negative.      Historical Information   The patient history was reviewed as follows:  Past Medical History:   Diagnosis Date    Allergic     seasonal    Asthma     very rarely    Colon polyp     GERD (gastroesophageal reflux disease) 10+ years ago    Hypertension     Myocardial infarction (HCC)     Obesity     Patient denies medical problems     PONV (postoperative nausea and vomiting)          Medications:     Current Outpatient Medications:     Aspir-Low 81 MG EC tablet, , Disp: , Rfl:     atorvastatin (LIPITOR) 80 mg tablet, , Disp: , Rfl:     Calcium 500 MG tablet, Take 500 mg by mouth, Disp: , Rfl:     carvedilol (COREG) 3.125 mg tablet, Take 1 tablet (3.125 mg total) by mouth 2 (two) times a day with meals, Disp: 60 tablet, Rfl: 8    Entresto 24-26 MG TABS, Take by mouth in the morning, Disp: , Rfl:     ezetimibe (ZETIA) 10 mg tablet, Take 10 mg by mouth daily, Disp: , Rfl:     famotidine (PEPCID) 40 MG tablet, TAKE 1 TABLET BY MOUTH DAILY AT BEDTIME, Disp: 90 tablet, Rfl: 1    Jardiance 10 MG TABS tablet, Take 10 mg by mouth daily, Disp: , Rfl:     multivitamin (THERAGRAN) TABS, Take 1 tablet by mouth daily, Disp: , Rfl:     pantoprazole (PROTONIX) 40 mg tablet, Take 1 tablet (40 mg total) by mouth daily, Disp: 90 tablet, Rfl: 1     "spironolactone (ALDACTONE) 25 mg tablet, Take 25 mg by mouth daily, Disp: , Rfl:     ticagrelor (Brilinta) 90 MG, Take 90 mg by mouth 2 (two) times a day, Disp: , Rfl:     tirzepatide (Zepbound) 2.5 mg/0.5 mL auto-injector, Inject 0.5 mL (2.5 mg total) under the skin once a week for 28 days, Disp: 2 mL, Rfl: 0    Allergies   Allergen Reactions    Strawberry Extract - Food Allergy Other (See Comments)       OBJECTIVE  Vitals:   Vitals:    10/18/24 1541   BP: 110/78   BP Location: Left arm   Patient Position: Sitting   Cuff Size: Large   Pulse: 75   Resp: 12   Temp: 97.7 °F (36.5 °C)   TempSrc: Temporal   SpO2: 98%   Weight: 103 kg (227 lb)   Height: 5' 5\" (1.651 m)         Physical Exam  Vitals reviewed.   Constitutional:       Appearance: Normal appearance. She is well-developed.   HENT:      Head: Normocephalic and atraumatic.   Eyes:      Conjunctiva/sclera: Conjunctivae normal.      Pupils: Pupils are equal, round, and reactive to light.   Cardiovascular:      Rate and Rhythm: Normal rate and regular rhythm.      Heart sounds: Normal heart sounds, S1 normal and S2 normal. No murmur heard.  Pulmonary:      Effort: Pulmonary effort is normal. No respiratory distress.      Breath sounds: Normal breath sounds. No wheezing.   Musculoskeletal:         General: Normal range of motion.      Cervical back: Normal range of motion and neck supple.   Skin:     General: Skin is warm.   Neurological:      General: No focal deficit present.      Mental Status: She is alert and oriented to person, place, and time.   Psychiatric:         Mood and Affect: Mood normal.         Speech: Speech normal.         Behavior: Behavior normal.         Thought Content: Thought content normal.         Judgment: Judgment normal.                    Sarah Delgado MD,   [facility]  10/18/2024      "

## 2024-10-22 NOTE — TELEPHONE ENCOUNTER
PA for Zepbound 2.5 mg /0.5 ml SUBMITTED     via    []CMM-KEY:   []Surescripts-Case ID #   []Availity-Auth ID # NDC #   [x]Faxed to [facility] [phone]  []Other website   []Phone call Case ID #     Office notes sent, clinical questions answered. Awaiting determination    Turnaround time for your insurance to make a decision on your Prior Authorization can take 7-21 business days.

## 2024-10-30 NOTE — TELEPHONE ENCOUNTER
PA for Zepbound 2.5 mg/0.5 ml  EXCLUDED from plan       Reason:(Screenshot if applicable)  Per status check zepbound is excluded , denial will be faxed to pod      Message sent to office clinical pool Yes

## 2024-11-26 ENCOUNTER — OFFICE VISIT (OUTPATIENT)
Dept: CARDIOLOGY CLINIC | Facility: CLINIC | Age: 54
End: 2024-11-26
Payer: COMMERCIAL

## 2024-11-26 VITALS
HEIGHT: 65 IN | WEIGHT: 230 LBS | BODY MASS INDEX: 38.32 KG/M2 | DIASTOLIC BLOOD PRESSURE: 90 MMHG | HEART RATE: 60 BPM | OXYGEN SATURATION: 97 % | SYSTOLIC BLOOD PRESSURE: 130 MMHG

## 2024-11-26 DIAGNOSIS — I25.10 CORONARY ARTERY DISEASE INVOLVING NATIVE CORONARY ARTERY OF NATIVE HEART WITHOUT ANGINA PECTORIS: Primary | ICD-10-CM

## 2024-11-26 DIAGNOSIS — E66.812 CLASS 2 OBESITY WITHOUT SERIOUS COMORBIDITY WITH BODY MASS INDEX (BMI) OF 38.0 TO 38.9 IN ADULT, UNSPECIFIED OBESITY TYPE: ICD-10-CM

## 2024-11-26 DIAGNOSIS — I10 HYPERTENSION, UNSPECIFIED TYPE: ICD-10-CM

## 2024-11-26 DIAGNOSIS — I50.22 CHRONIC SYSTOLIC CONGESTIVE HEART FAILURE (HCC): ICD-10-CM

## 2024-11-26 PROCEDURE — 99203 OFFICE O/P NEW LOW 30 MIN: CPT | Performed by: INTERNAL MEDICINE

## 2024-11-26 PROCEDURE — 93000 ELECTROCARDIOGRAM COMPLETE: CPT | Performed by: INTERNAL MEDICINE

## 2024-11-26 NOTE — ASSESSMENT & PLAN NOTE
- BP goal < 120/80  - Discussed diet and weight loss.  - Consider increase in Entresto if not at goal

## 2024-11-26 NOTE — PROGRESS NOTES
[facility]  [address] [place] [address] [address]   [place] [address]    Cardiology Consultation    Sandrita Phillip  [id number]  1970      Consult for: CAD  Appreciate consult by: Sarah Taylor MD      Discussion/Summary:     Assessment & Plan  Coronary artery disease involving native coronary artery of native heart without angina pectoris  - Patient without angina.  She had recovery of ejection fraction on her last echocardiogram.  -Continue aspirin and Brilinta.  Although stent was over a year ago, it was in LAD location at a bifurcation - will discuss discontinuation next visit.  - Risk factor reduction reviewed.   Hypertension, unspecified type  - BP goal < 120/80  - Discussed diet and weight loss.  - Consider increase in Entresto if not at goal  Class 2 obesity without serious comorbidity with body mass index (BMI) of 38.0 to 38.9 in adult, unspecified obesity type  - She is working with PMD about a GLP-1 agonist  Chronic systolic congestive heart failure (HCC)  Wt Readings from Last 3 Encounters:   11/26/24 104 kg (230 lb)   10/18/24 103 kg (227 lb)   09/13/24 102 kg (225 lb)   -Patient had a drop in ejection fraction at time of myocardial infarction.  It has recovered.  -Continue Entresto, Jardiance and carvedilol  -Echocardiogram next visit.          HPI:     Sandrita Phillip is a 54 y.o. here for evaluation of CAD.   In October 2023, she presented to [facility] with a non-ST segment elevation myocardial infarction.  Cardiac catheterization showed left main with no significant disease.  LAD had 95% stenosis and circumflex and RCA had no significant disease.  She underwent PCI with 3.5 x 15 mm [employer] Christian drug-eluting stent with good result afterwards.  At the time, ejection fraction was reduced but subsequent echocardiogram at her previous cardiologist's office showed an improvement.  She had blood work done in July 2024 which showed total  cholesterol 146 with LDL of 65 and LPA of 295. CRP was 10.8.  Currently, she uses atorvastatin 80 mg daily and Zetia 10 mg daily.  For blood pressure, she uses carvedilol 3.125 mg twice daily and spironolactone 25 mg daily.  She also uses Entresto 24-26 mg twice daily.  For antiplatelets she is on Brilinta 90 mg twice daily and aspirin 81 mg daily.    She denies any chest pain or shortness of breath with exertion.  Reports good adherence with medications.     Past Medical History:   Diagnosis Date    Allergic     seasonal    Asthma     very rarely    Colon polyp     GERD (gastroesophageal reflux disease) 10+ years ago    Hypertension     Myocardial infarction (HCC)     Obesity     Patient denies medical problems     PONV (postoperative nausea and vomiting)      Social History     Tobacco Use    Smoking status: Former     Current packs/day: 0.00     Average packs/day: 0.5 packs/day for 15.0 years (7.5 ttl pk-yrs)     Types: Cigarettes     Start date: 1986     Quit date: 2001     Years since quittin.6    Smokeless tobacco: Never   Vaping Use    Vaping status: Never Used   Substance Use Topics    Alcohol use: Yes     Comment: socially    Drug use: Never      Family History   Problem Relation Age of Onset    Breast cancer Mother 68    Hypertension Mother     Melanoma Mother 67    Coronary artery disease Mother     Arthritis Mother     Heart disease Father          at 70    Hyperlipidemia Father     Prostate cancer Father 65    Diabetes Father      Past Surgical History:   Procedure Laterality Date    ADENOIDECTOMY       SECTION      x1    COLONOSCOPY      CORONARY ANGIOPLASTY WITH STENT PLACEMENT      TONSILLECTOMY         Current Outpatient Medications:     Aspir-Low 81 MG EC tablet, , Disp: , Rfl:     atorvastatin (LIPITOR) 80 mg tablet, , Disp: , Rfl:     Calcium 500 MG tablet, Take 500 mg by mouth, Disp: , Rfl:     carvedilol (COREG) 3.125 mg tablet, Take 1 tablet (3.125 mg total) by  "mouth 2 (two) times a day with meals, Disp: 60 tablet, Rfl: 8    Entresto 24-26 MG TABS, Take by mouth in the morning, Disp: , Rfl:     ezetimibe (ZETIA) 10 mg tablet, Take 10 mg by mouth daily, Disp: , Rfl:     famotidine (PEPCID) 40 MG tablet, TAKE 1 TABLET BY MOUTH DAILY AT BEDTIME, Disp: 90 tablet, Rfl: 1    Jardiance 10 MG TABS tablet, Take 10 mg by mouth daily, Disp: , Rfl:     multivitamin (THERAGRAN) TABS, Take 1 tablet by mouth daily, Disp: , Rfl:     pantoprazole (PROTONIX) 40 mg tablet, Take 1 tablet (40 mg total) by mouth daily, Disp: 90 tablet, Rfl: 1    spironolactone (ALDACTONE) 25 mg tablet, Take 25 mg by mouth daily, Disp: , Rfl:     ticagrelor (Brilinta) 90 MG, Take 90 mg by mouth 2 (two) times a day, Disp: , Rfl:   Allergies   Allergen Reactions    Strawberry Extract - Food Allergy Other (See Comments)       Review of Systems:   Review of Systems   Respiratory:  Negative for shortness of breath.    Cardiovascular:  Negative for chest pain, palpitations and leg swelling.   Musculoskeletal:  Positive for arthralgias.   All other systems reviewed and are negative.      Physical Examination:     Vitals:    11/26/24 0835   BP: 130/90   BP Location: Right arm   Patient Position: Sitting   Cuff Size: Large   Pulse: 60   SpO2: 97%   Weight: 104 kg (230 lb)   Height: 5' 5\" (1.651 m)       Physical Exam   Constitutional: She appears healthy. No distress.   Eyes: Pupils are equal, round, and reactive to light. Conjunctivae are normal.   Neck: No JVD present.   Cardiovascular: Normal rate, regular rhythm and normal heart sounds. Exam reveals no gallop and no friction rub.   No murmur heard.  Pulmonary/Chest: Effort normal and breath sounds normal. She has no wheezes. She has no rales.   Musculoskeletal:         General: No tenderness, deformity or edema.      Cervical back: Normal range of motion and neck supple.   Neurological: She is alert and oriented to person, place, and time.   Skin: Skin is warm and " "dry.        Labs:     Lab Results   Component Value Date    WBC 9.67 03/23/2024    HGB 14.2 03/23/2024    HCT 42.4 03/23/2024    MCV 90 03/23/2024    RDW 13.3 03/23/2024     03/23/2024     BMP:  Lab Results   Component Value Date    SODIUM 139 04/13/2024    K 4.6 04/13/2024     04/13/2024    CO2 28 04/13/2024    BUN 24 04/13/2024    CREATININE 0.94 04/13/2024    GLUF 107 (H) 04/13/2024    CALCIUM 8.9 04/13/2024    EGFR 69 04/13/2024     LFT:  Lab Results   Component Value Date    AST 19 03/23/2024    ALT 24 03/23/2024    ALKPHOS 86 03/23/2024    TP 6.8 03/23/2024    ALB 3.9 03/23/2024      Lab Results   Component Value Date    GJV8GUWBFRPQ 1.481 07/17/2024     No results found for: \"HGBA1C\"  Lipid Profile:   Lab Results   Component Value Date    TRIG 100 07/17/2024    HDL 61 07/17/2024       Imaging & Testing   I have personally reviewed pertinent reports.      Cardiac Testing   See above    EKG: Personally reviewed.    Normal ECG      Rolando Ding DO, [facility]  [phone]  Please call with any questions.  "

## 2024-12-16 ENCOUNTER — TELEPHONE (OUTPATIENT)
Age: 54
End: 2024-12-16

## 2024-12-16 NOTE — TELEPHONE ENCOUNTER
PA for pantoprazole 40 mg     SUBMITTED to [employer]    via    [x]The [employer]-Case ID # [id number]:Imp[employer]Phone:[phone]:[phone]     All office notes, labs and other pertaining documents and studies sent. Clinical questions answered. Awaiting determination from insurance company.     Turnaround time for your insurance to make a decision on your Prior Authorization can take 7-21 business days.

## 2024-12-17 NOTE — TELEPHONE ENCOUNTER
PA for pantorpazole APPROVED     Date(s) approved Authorized from December 16, 2024 to December 16, 2025          Patient advised by          []MyChart Message  []Phone call   [x]LMOM  []L/M to call office as no active Communication consent on file  []Unable to leave detailed message as VM not approved on Communication consent       Pharmacy advised by    [x]Fax  []Phone call    Approval letter scanned into Media Yes

## 2025-01-13 DIAGNOSIS — I25.10 CORONARY ARTERY DISEASE INVOLVING NATIVE CORONARY ARTERY OF NATIVE HEART WITHOUT ANGINA PECTORIS: ICD-10-CM

## 2025-01-13 DIAGNOSIS — I50.22 CHRONIC SYSTOLIC CONGESTIVE HEART FAILURE (HCC): Primary | ICD-10-CM

## 2025-01-13 DIAGNOSIS — I10 HYPERTENSION, UNSPECIFIED TYPE: ICD-10-CM

## 2025-01-13 NOTE — TELEPHONE ENCOUNTER
Reason for call:   [x] Refill   [] Prior Auth  [] Other:     Office:   [] PCP/Provider -   [x] Specialty/Provider - Cardio/Toña    Atorvastatin 80mg  1 tab daily #90    Brilinta 90mg  1 tab bid #180    Entresto 24-26mg  1 tab daily #90    Ezetimibe 10mg  1 tab daily #90    Jardiance 10mg  1 tab daily #90    Sprionolactone 25mg  1 tab daily #90    Pharmacy: [facility]/pharmacy #[id number] - [address] [phone]     Does the patient have enough for 3 days?   [x] Yes   [] No - Send as HP to POD

## 2025-01-15 DIAGNOSIS — Z00.6 ENCOUNTER FOR EXAMINATION FOR NORMAL COMPARISON OR CONTROL IN CLINICAL RESEARCH PROGRAM: ICD-10-CM

## 2025-01-16 RX ORDER — SACUBITRIL AND VALSARTAN 24; 26 MG/1; MG/1
1 TABLET, FILM COATED ORAL DAILY
Qty: 90 TABLET | Refills: 1 | Status: SHIPPED | OUTPATIENT
Start: 2025-01-16

## 2025-01-16 RX ORDER — EMPAGLIFLOZIN 10 MG/1
10 TABLET, FILM COATED ORAL DAILY
Qty: 90 TABLET | Refills: 1 | Status: SHIPPED | OUTPATIENT
Start: 2025-01-16

## 2025-01-16 RX ORDER — EZETIMIBE 10 MG/1
10 TABLET ORAL DAILY
Qty: 90 TABLET | Refills: 1 | Status: SHIPPED | OUTPATIENT
Start: 2025-01-16

## 2025-01-16 RX ORDER — ATORVASTATIN CALCIUM 80 MG/1
80 TABLET, FILM COATED ORAL DAILY
Qty: 90 TABLET | Refills: 1 | Status: SHIPPED | OUTPATIENT
Start: 2025-01-16

## 2025-01-16 RX ORDER — SPIRONOLACTONE 25 MG/1
25 TABLET ORAL DAILY
Qty: 90 TABLET | Refills: 1 | Status: SHIPPED | OUTPATIENT
Start: 2025-01-16

## 2025-01-23 ENCOUNTER — APPOINTMENT (OUTPATIENT)
Dept: LAB | Facility: CLINIC | Age: 55
End: 2025-01-23

## 2025-01-23 DIAGNOSIS — Z00.6 ENCOUNTER FOR EXAMINATION FOR NORMAL COMPARISON OR CONTROL IN CLINICAL RESEARCH PROGRAM: ICD-10-CM

## 2025-01-23 PROCEDURE — 36415 COLL VENOUS BLD VENIPUNCTURE: CPT

## 2025-02-04 LAB
APOB+LDLR+PCSK9 GENE MUT ANL BLD/T: NOT DETECTED
BRCA1+BRCA2 DEL+DUP + FULL MUT ANL BLD/T: NOT DETECTED
MLH1+MSH2+MSH6+PMS2 GN DEL+DUP+FUL M: NOT DETECTED

## 2025-03-18 ENCOUNTER — HOSPITAL ENCOUNTER (OUTPATIENT)
Dept: RADIOLOGY | Facility: HOSPITAL | Age: 55
Discharge: HOME | End: 2025-03-18
Payer: COMMERCIAL

## 2025-03-18 DIAGNOSIS — Z12.31 ENCOUNTER FOR SCREENING MAMMOGRAM FOR MALIGNANT NEOPLASM OF BREAST: ICD-10-CM

## 2025-03-18 PROCEDURE — 77063 BREAST TOMOSYNTHESIS BI: CPT

## 2025-03-18 PROCEDURE — 77067 SCR MAMMO BI INCL CAD: CPT

## 2025-03-20 ENCOUNTER — RESULTS FOLLOW-UP (OUTPATIENT)
Dept: LABOR AND DELIVERY | Facility: HOSPITAL | Age: 55
End: 2025-03-20

## 2025-04-08 ENCOUNTER — TELEPHONE (OUTPATIENT)
Age: 55
End: 2025-04-08

## 2025-04-09 ENCOUNTER — APPOINTMENT (OUTPATIENT)
Dept: LAB | Facility: CLINIC | Age: 55
End: 2025-04-09
Payer: COMMERCIAL

## 2025-04-09 ENCOUNTER — TELEPHONE (OUTPATIENT)
Age: 55
End: 2025-04-09

## 2025-04-09 DIAGNOSIS — E78.5 HYPERLIPIDEMIA, UNSPECIFIED HYPERLIPIDEMIA TYPE: ICD-10-CM

## 2025-04-09 DIAGNOSIS — I10 PRIMARY HYPERTENSION: ICD-10-CM

## 2025-04-09 DIAGNOSIS — Z13.29 SCREENING FOR THYROID DISORDER: ICD-10-CM

## 2025-04-09 DIAGNOSIS — R73.09 ABNORMAL GLUCOSE: ICD-10-CM

## 2025-04-09 DIAGNOSIS — Z13.29 SCREENING FOR THYROID DISORDER: Primary | ICD-10-CM

## 2025-04-09 LAB
ALBUMIN SERPL BCG-MCNC: 4.5 G/DL (ref 3.5–5)
ALP SERPL-CCNC: 128 U/L (ref 34–104)
ALT SERPL W P-5'-P-CCNC: 29 U/L (ref 7–52)
ANION GAP SERPL CALCULATED.3IONS-SCNC: 10 MMOL/L (ref 4–13)
AST SERPL W P-5'-P-CCNC: 26 U/L (ref 13–39)
BASOPHILS # BLD AUTO: 0.04 THOUSANDS/ÂΜL (ref 0–0.1)
BASOPHILS NFR BLD AUTO: 0 % (ref 0–1)
BILIRUB SERPL-MCNC: 0.81 MG/DL (ref 0.2–1)
BUN SERPL-MCNC: 15 MG/DL (ref 5–25)
CALCIUM SERPL-MCNC: 9.6 MG/DL (ref 8.4–10.2)
CHLORIDE SERPL-SCNC: 101 MMOL/L (ref 96–108)
CHOLEST SERPL-MCNC: 175 MG/DL (ref ?–200)
CO2 SERPL-SCNC: 27 MMOL/L (ref 21–32)
CREAT SERPL-MCNC: 0.8 MG/DL (ref 0.6–1.3)
EOSINOPHIL # BLD AUTO: 0.37 THOUSAND/ÂΜL (ref 0–0.61)
EOSINOPHIL NFR BLD AUTO: 4 % (ref 0–6)
ERYTHROCYTE [DISTWIDTH] IN BLOOD BY AUTOMATED COUNT: 13 % (ref 11.6–15.1)
GFR SERPL CREATININE-BSD FRML MDRD: 83 ML/MIN/1.73SQ M
GLUCOSE P FAST SERPL-MCNC: 88 MG/DL (ref 65–99)
HCT VFR BLD AUTO: 45.9 % (ref 34.8–46.1)
HDLC SERPL-MCNC: 60 MG/DL
HGB BLD-MCNC: 14.9 G/DL (ref 11.5–15.4)
IMM GRANULOCYTES # BLD AUTO: 0.03 THOUSAND/UL (ref 0–0.2)
IMM GRANULOCYTES NFR BLD AUTO: 0 % (ref 0–2)
LDLC SERPL CALC-MCNC: 82 MG/DL (ref 0–100)
LYMPHOCYTES # BLD AUTO: 1.97 THOUSANDS/ÂΜL (ref 0.6–4.47)
LYMPHOCYTES NFR BLD AUTO: 20 % (ref 14–44)
MCH RBC QN AUTO: 28.9 PG (ref 26.8–34.3)
MCHC RBC AUTO-ENTMCNC: 32.5 G/DL (ref 31.4–37.4)
MCV RBC AUTO: 89 FL (ref 82–98)
MONOCYTES # BLD AUTO: 0.58 THOUSAND/ÂΜL (ref 0.17–1.22)
MONOCYTES NFR BLD AUTO: 6 % (ref 4–12)
NEUTROPHILS # BLD AUTO: 6.95 THOUSANDS/ÂΜL (ref 1.85–7.62)
NEUTS SEG NFR BLD AUTO: 70 % (ref 43–75)
NONHDLC SERPL-MCNC: 115 MG/DL
NRBC BLD AUTO-RTO: 0 /100 WBCS
PLATELET # BLD AUTO: 285 THOUSANDS/UL (ref 149–390)
PMV BLD AUTO: 10.8 FL (ref 8.9–12.7)
POTASSIUM SERPL-SCNC: 4.2 MMOL/L (ref 3.5–5.3)
PROT SERPL-MCNC: 7.9 G/DL (ref 6.4–8.4)
RBC # BLD AUTO: 5.15 MILLION/UL (ref 3.81–5.12)
SODIUM SERPL-SCNC: 138 MMOL/L (ref 135–147)
TRIGL SERPL-MCNC: 165 MG/DL (ref ?–150)
TSH SERPL DL<=0.05 MIU/L-ACNC: 1.38 UIU/ML (ref 0.45–4.5)
WBC # BLD AUTO: 9.94 THOUSAND/UL (ref 4.31–10.16)

## 2025-04-09 PROCEDURE — 80053 COMPREHEN METABOLIC PANEL: CPT

## 2025-04-09 PROCEDURE — 84443 ASSAY THYROID STIM HORMONE: CPT

## 2025-04-09 PROCEDURE — 80061 LIPID PANEL: CPT

## 2025-04-09 PROCEDURE — 36415 COLL VENOUS BLD VENIPUNCTURE: CPT

## 2025-04-09 PROCEDURE — 85025 COMPLETE CBC W/AUTO DIFF WBC: CPT

## 2025-04-10 ENCOUNTER — OFFICE VISIT (OUTPATIENT)
Dept: URGENT CARE | Facility: CLINIC | Age: 55
End: 2025-04-10
Payer: COMMERCIAL

## 2025-04-10 ENCOUNTER — OFFICE VISIT (OUTPATIENT)
Dept: FAMILY MEDICINE CLINIC | Facility: CLINIC | Age: 55
End: 2025-04-10
Payer: COMMERCIAL

## 2025-04-10 VITALS
WEIGHT: 237.6 LBS | RESPIRATION RATE: 18 BRPM | TEMPERATURE: 97 F | OXYGEN SATURATION: 95 % | BODY MASS INDEX: 39.58 KG/M2 | HEIGHT: 65 IN | DIASTOLIC BLOOD PRESSURE: 80 MMHG | SYSTOLIC BLOOD PRESSURE: 112 MMHG | HEART RATE: 71 BPM

## 2025-04-10 VITALS
WEIGHT: 238 LBS | HEIGHT: 65 IN | DIASTOLIC BLOOD PRESSURE: 89 MMHG | HEART RATE: 71 BPM | SYSTOLIC BLOOD PRESSURE: 126 MMHG | TEMPERATURE: 98.9 F | OXYGEN SATURATION: 97 % | BODY MASS INDEX: 39.65 KG/M2 | RESPIRATION RATE: 18 BRPM

## 2025-04-10 DIAGNOSIS — H67.2 OTITIS MEDIA OF LEFT EAR IN DISEASE CLASSIFIED ELSEWHERE: ICD-10-CM

## 2025-04-10 DIAGNOSIS — H66.002 NON-RECURRENT ACUTE SUPPURATIVE OTITIS MEDIA OF LEFT EAR WITHOUT SPONTANEOUS RUPTURE OF TYMPANIC MEMBRANE: Primary | ICD-10-CM

## 2025-04-10 DIAGNOSIS — I25.10 CORONARY ARTERY DISEASE DUE TO LIPID RICH PLAQUE: ICD-10-CM

## 2025-04-10 DIAGNOSIS — K21.9 GASTROESOPHAGEAL REFLUX DISEASE, UNSPECIFIED WHETHER ESOPHAGITIS PRESENT: ICD-10-CM

## 2025-04-10 DIAGNOSIS — I25.83 CORONARY ARTERY DISEASE DUE TO LIPID RICH PLAQUE: ICD-10-CM

## 2025-04-10 DIAGNOSIS — H10.33 ACUTE CONJUNCTIVITIS OF BOTH EYES, UNSPECIFIED ACUTE CONJUNCTIVITIS TYPE: ICD-10-CM

## 2025-04-10 DIAGNOSIS — Z00.00 ANNUAL PHYSICAL EXAM: Primary | ICD-10-CM

## 2025-04-10 DIAGNOSIS — B34.9 VIRAL SYNDROME: ICD-10-CM

## 2025-04-10 PROCEDURE — 99396 PREV VISIT EST AGE 40-64: CPT | Performed by: FAMILY MEDICINE

## 2025-04-10 PROCEDURE — 99213 OFFICE O/P EST LOW 20 MIN: CPT | Performed by: PHYSICIAN ASSISTANT

## 2025-04-10 RX ORDER — AMOXICILLIN 875 MG/1
875 TABLET, COATED ORAL 2 TIMES DAILY
Qty: 20 TABLET | Refills: 0 | Status: SHIPPED | OUTPATIENT
Start: 2025-04-10 | End: 2025-04-20

## 2025-04-10 RX ORDER — OFLOXACIN 3 MG/ML
1 SOLUTION/ DROPS OPHTHALMIC 4 TIMES DAILY
Qty: 10 ML | Refills: 0 | Status: SHIPPED | OUTPATIENT
Start: 2025-04-10 | End: 2025-04-17

## 2025-04-10 NOTE — PATIENT INSTRUCTIONS
"Patient Education     Routine physical for adults   The Basics   Written by the doctors and editors at [facility]   What is a physical? -- A physical is a routine visit, or \"check-up,\" with your doctor. You might also hear it called a \"wellness visit\" or \"preventive visit.\"  During each visit, the doctor will:   Ask about your physical and mental health   Ask about your habits, behaviors, and lifestyle   Do an exam   Give you vaccines if needed   Talk to you about any medicines you take   Give advice about your health   Answer your questions  Getting regular check-ups is an important part of taking care of your health. It can help your doctor find and treat any problems you have. But it's also important for preventing health problems.  A routine physical is different from a \"sick visit.\" A sick visit is when you see a doctor because of a health concern or problem. Since physicals are scheduled ahead of time, you can think about what you want to ask the doctor.  How often should I get a physical? -- It depends on your age and health. In general, for people age 21 years and older:   If you are younger than 50 years, you might be able to get a physical every 3 years.   If you are 50 years or older, your doctor might recommend a physical every year.  If you have an ongoing health condition, like diabetes or high blood pressure, your doctor will probably want to see you more often.  What happens during a physical? -- In general, each visit will include:   Physical exam - The doctor or nurse will check your height, weight, heart rate, and blood pressure. They will also look at your eyes and ears. They will ask about how you are feeling and whether you have any symptoms that bother you.   Medicines - It's a good idea to bring a list of all the medicines you take to each doctor visit. Your doctor will talk to you about your medicines and answer any questions. Tell them if you are having any side effects that bother you. You " "should also tell them if you are having trouble paying for any of your medicines.   Habits and behaviors - This includes:   Your diet   Your exercise habits   Whether you smoke, drink alcohol, or use drugs   Whether you are sexually active   Whether you feel safe at home  Your doctor will talk to you about things you can do to improve your health and lower your risk of health problems. They will also offer help and support. For example, if you want to quit smoking, they can give you advice and might prescribe medicines. If you want to improve your diet or get more physical activity, they can help you with this, too.   Lab tests, if needed - The tests you get will depend on your age and situation. For example, your doctor might want to check your:   Cholesterol   Blood sugar   Iron level   Vaccines - The recommended vaccines will depend on your age, health, and what vaccines you already had. Vaccines are very important because they can prevent certain serious or deadly infections.   Discussion of screening - \"Screening\" means checking for diseases or other health problems before they cause symptoms. Your doctor can recommend screening based on your age, risk, and preferences. This might include tests to check for:   Cancer, such as breast, prostate, cervical, ovarian, colorectal, prostate, lung, or skin cancer   Sexually transmitted infections, such as chlamydia and gonorrhea   Mental health conditions like depression and anxiety  Your doctor will talk to you about the different types of screening tests. They can help you decide which screenings to have. They can also explain what the results might mean.   Answering questions - The physical is a good time to ask the doctor or nurse questions about your health. If needed, they can refer you to other doctors or specialists, too.  Adults older than 65 years often need other care, too. As you get older, your doctor will talk to you about:   How to prevent falling at " home   Hearing or vision tests   Memory testing   How to take your medicines safely   Making sure that you have the help and support you need at home  All topics are updated as new evidence becomes available and our peer review process is complete.  This topic retrieved from [employer] on: May 02, 2024.  Topic 910919 Version 1.0  Release: 32.4.3 - C32.122  © 2024 UpToDate, Inc. and/or its affiliates. All rights reserved.  Consumer Information Use and Disclaimer   Disclaimer: This generalized information is a limited summary of diagnosis, treatment, and/or medication information. It is not meant to be comprehensive and should be used as a tool to help the user understand and/or assess potential diagnostic and treatment options. It does NOT include all information about conditions, treatments, medications, side effects, or risks that may apply to a specific patient. It is not intended to be medical advice or a substitute for the medical advice, diagnosis, or treatment of a health care provider based on the health care provider's examination and assessment of a patient's specific and unique circumstances. Patients must speak with a health care provider for complete information about their health, medical questions, and treatment options, including any risks or benefits regarding use of medications. This information does not endorse any treatments or medications as safe, effective, or approved for treating a specific patient. UpToDate, Inc. and its affiliates disclaim any warranty or liability relating to this information or the use thereof.The use of this information is governed by the Terms of Use, available at https://www.woltersOptifyuwer.com/en/know/clinical-effectiveness-terms. 2024© UpToDate, Inc. and its affiliates and/or licensors. All rights reserved.  Copyright   © 2024 UpToDate, Inc. and/or its affiliates. All rights reserved.

## 2025-04-10 NOTE — PROGRESS NOTES
Adult Annual Physical  Name: Sandrita Phillip      : 1970      MRN: [id number]  Encounter Provider: Sarah Taylor MD  Encounter Date: 4/10/2025   Encounter department: [facility]    :  Assessment & Plan  Annual physical exam         Otitis media of left ear in disease classified elsewhere  Otitis media of the left ear recommend treatment that was given by urgent care       Viral syndrome         Coronary artery disease due to lipid rich plaque  Continue being followed by cardiology       Gastroesophageal reflux disease, unspecified whether esophagitis present  Acid reflux continue using Protonix as needed           Preventive Screenings:    - Cervical cancer screening: screening up-to-date   - Breast cancer screening: screening up-to-date     Immunizations:  - Immunizations due: Zoster (Shingrix)         History of Present Illness     Adult Annual Physical:  Patient presents for annual physical. 54-year-old female presenting to the office for physical exam.  Was just recently seen in the urgent care this morning Saturday sore throat, and left ear is infected. Viral eye discharge.  Patient states in starting treatment she feels slightly better.  Overall patient has no acute concerns has been following specialist as indicated..     Diet and Physical Activity:  - Diet/Nutrition: no special diet.  - Exercise: no formal exercise.    Depression Screening:  - PHQ-2 Score: 0    General Health:  - Sleep: sleeps well and > 8 hours of sleep on average.  - Hearing: normal hearing bilateral ears.  - Vision: wears glasses and most recent eye exam < 1 year ago.  - Dental: regular dental visits and brushes teeth once daily.    /GYN Health:  - Follows with GYN: yes.   - Menopause: postmenopausal.   - History of STDs: no    Advanced Care Planning:  - Has an advanced directive?: no    - Has a durable medical POA?: no    - ACP document given to patient?: no      Review of Systems   Constitutional:   Positive for fatigue. Negative for activity change, appetite change, chills and fever.   HENT:  Positive for congestion, ear pain and sore throat.    Respiratory:  Positive for chest tightness (discomfort). Negative for cough and shortness of breath.    Cardiovascular:  Negative for chest pain and leg swelling.   Gastrointestinal:  Negative for abdominal distention, abdominal pain, constipation, diarrhea, nausea and vomiting.   Musculoskeletal: Negative.    All other systems reviewed and are negative.    Current Outpatient Medications on File Prior to Visit   Medication Sig Dispense Refill   • Aspir-Low 81 MG EC tablet      • atorvastatin (LIPITOR) 80 mg tablet Take 1 tablet (80 mg total) by mouth daily 90 tablet 1   • Calcium 500 MG tablet Take 500 mg by mouth     • carvedilol (COREG) 3.125 mg tablet Take 1 tablet (3.125 mg total) by mouth 2 (two) times a day with meals 60 tablet 8   • Entresto 24-26 MG TABS Take 1 tablet by mouth in the morning 90 tablet 1   • ezetimibe (ZETIA) 10 mg tablet Take 1 tablet (10 mg total) by mouth daily 90 tablet 1   • Jardiance 10 MG TABS tablet Take 1 tablet (10 mg total) by mouth daily 90 tablet 1   • multivitamin (THERAGRAN) TABS Take 1 tablet by mouth daily     • spironolactone (ALDACTONE) 25 mg tablet Take 1 tablet (25 mg total) by mouth daily 90 tablet 1   • ticagrelor (Brilinta) 90 MG Take 1 tablet (90 mg total) by mouth 2 (two) times a day 180 tablet 1   • pantoprazole (PROTONIX) 40 mg tablet Take 1 tablet (40 mg total) by mouth daily 90 tablet 1     No current facility-administered medications on file prior to visit.      Social History     Tobacco Use   • Smoking status: Former     Current packs/day: 0.00     Average packs/day: 0.5 packs/day for 15.0 years (7.5 ttl pk-yrs)     Types: Cigarettes     Start date: 1986     Quit date: 2001     Years since quittin.0   • Smokeless tobacco: Never   Vaping Use   • Vaping status: Never Used   Substance and Sexual  "Activity   • Alcohol use: Yes     Comment: socially   • Drug use: Never   • Sexual activity: Yes     Partners: Male     Birth control/protection: Condom Male       Objective   /80 (BP Location: Left arm, Patient Position: Sitting, Cuff Size: Large)   Pulse 71   Temp (!) 97 °F (36.1 °C) (Temporal)   Resp 18   Ht 5' 5\" (1.651 m)   Wt 108 kg (237 lb 9.6 oz)   LMP 07/13/2022 (Approximate)   SpO2 95%   BMI 39.54 kg/m²     Physical Exam  Vitals reviewed.   Constitutional:       Appearance: Normal appearance. She is well-developed.   HENT:      Head: Normocephalic and atraumatic.      Right Ear: Tympanic membrane, ear canal and external ear normal. There is no impacted cerumen.      Left Ear: Tympanic membrane, ear canal and external ear normal. There is no impacted cerumen.      Nose: Nose normal.      Mouth/Throat:      Mouth: Mucous membranes are moist.      Pharynx: Oropharynx is clear.   Eyes:      Conjunctiva/sclera: Conjunctivae normal.      Pupils: Pupils are equal, round, and reactive to light.   Cardiovascular:      Rate and Rhythm: Normal rate and regular rhythm.      Heart sounds: Normal heart sounds.   Pulmonary:      Effort: Pulmonary effort is normal.      Breath sounds: Normal breath sounds.   Abdominal:      General: Abdomen is flat. Bowel sounds are normal.      Palpations: Abdomen is soft.   Musculoskeletal:         General: Normal range of motion.      Cervical back: Normal range of motion and neck supple.   Skin:     General: Skin is warm.      Capillary Refill: Capillary refill takes less than 2 seconds.   Neurological:      General: No focal deficit present.      Mental Status: She is alert and oriented to person, place, and time. Mental status is at baseline.   Psychiatric:         Mood and Affect: Mood normal.         Behavior: Behavior normal.         Thought Content: Thought content normal.         Judgment: Judgment normal.         "

## 2025-04-10 NOTE — PROGRESS NOTES
[facility]        NAME: Sandrita Phillip is a 54 y.o. female  : 1970    MRN: [id number]  DATE: April 10, 2025  TIME: 9:40 AM    Assessment and Plan   Non-recurrent acute suppurative otitis media of left ear without spontaneous rupture of tympanic membrane [H66.002]  1. Non-recurrent acute suppurative otitis media of left ear without spontaneous rupture of tympanic membrane  amoxicillin (AMOXIL) 875 mg tablet      2. Acute conjunctivitis of both eyes, unspecified acute conjunctivitis type  ofloxacin (OCUFLOX) 0.3 % ophthalmic solution            Patient Instructions   L otitis media: Otitis media of the L ear will be treated with amoxicillin 875mg taken twice daily for ten days. Take with meals and a probiotic.   -Stay very well hydrated and push fluids, gatorade, pedialyte or electrolyte drinks can be beneficial. Staying hydrated is very important.   -Run a humidifier by your bed and take steamy showers to clear mucus   -Zicam nasal AllClear can be soothing to the nasal passages   -You can use flonase once daily for two weeks   -Advil or Tylenol for fever or pain.  -Diffusing aromatherapy oils, such as peppermint and eucalyptus can be soothing and help with congestion.   -Mucinex OTC or Zyrtec or Claritin. Drink plenty of water with the mucinex.   -Honey or throat lozenges for cough may be helpful  -Warm salt water gargles and tea with honey. Warm soups and teas can be soothing.   -Sleep with a few pillows propping you up at night to aid with coughing and congestion.   -Obtain a pulse ox device and check your O2 1-2 times a day. You want your oxygen levels to be >93%. If they go below 93% go to the ED immediately.   -Vitamin C 500mg, Vitamin D 2000IU daily. You can attempt to use zinc or Zicam up to 30mg per day.  Echinacea and elderberry may also aid with fighting viral infections.   -If your sx worsen or persist follow up with your PCP for recheck. Red flag signs and ED precautions discussed.      Acute Conjunctivitis:   -The patient's hx is consistent with possible bacterial conjunctivitis. Will prescribe Ofloxacin eye drops to be used as directed.   -Cool or warm compresses on the eyes for comfort   -Saline eye drops to flush discharge. Keep the drops in the fridge for a cooling effect.    -Avoid contact lenses until your symptoms improve  -Sunglasses can be worn for light sensitivity  -Frequent hand washing to avoid the spread  -Follow up with your Eye Doctor immediately if your sx worsen or persist         Follow up with PCP in 3-5 days.  Proceed to  ER if symptoms worsen.    If tests have been performed at [facility], our office will contact you with results if changes need to be made to the care plan discussed with you at the visit.  You can review your full [facility] on [facility] MyChart.    Chief Complaint     Chief Complaint   Patient presents with    Cold Like Symptoms     Pt here  ill x 5 days pt states s/s  sore throat, tired,  eye discharge,  cough. Pt used  throat  lozenges,  and eye drops.         History of Present Illness       Sandrita is a 54-year-old female with a hx of asthma, GERD, HTN, HLD who presents today for a five day hx of sore throat, fatigue, watery/red eyes, coughing. She states that she has crusts in the morning upon awakening. Her main concern is the sore throat. She states that she has been using throat lozenges and OTC eye drops. No fever, chills, body aches. No dyspnea, wheezing, chest tightness, cp, palpitations. No dizziness or weakness. No nausea, vomiting, diarrhea, constipation, abdominal pain.   No stridor or drooling. No rash. No sweats or diaphoresis. No muffled voice.  Good PO intake. No loss of taste or smell. No lower extremity edema. No known sick contacts or recent travel. She states that her asthma is stress induced.           Review of Systems   Review of Systems   Constitutional:  Negative for activity change, appetite change, chills, diaphoresis, fatigue and  fever.   HENT:  Positive for congestion, postnasal drip, rhinorrhea and sore throat. Negative for ear discharge, ear pain, facial swelling, hearing loss, nosebleeds, sinus pressure, sinus pain, tinnitus, trouble swallowing and voice change.    Eyes:  Positive for discharge and redness. Negative for photophobia, pain, itching and visual disturbance.   Respiratory:  Positive for cough. Negative for apnea, chest tightness, shortness of breath, wheezing and stridor.    Cardiovascular:  Negative for chest pain, palpitations and leg swelling.   Gastrointestinal:  Negative for abdominal distention and abdominal pain.   Genitourinary:  Negative for decreased urine volume.   Musculoskeletal:  Negative for arthralgias, joint swelling, myalgias, neck pain and neck stiffness.   Skin:  Negative for rash.   Allergic/Immunologic: Negative for immunocompromised state.   Neurological:  Negative for dizziness, weakness, light-headedness, numbness and headaches.   Hematological:  Negative for adenopathy.         Current Medications       Current Outpatient Medications:     amoxicillin (AMOXIL) 875 mg tablet, Take 1 tablet (875 mg total) by mouth 2 (two) times a day for 10 days, Disp: 20 tablet, Rfl: 0    Aspir-Low 81 MG EC tablet, , Disp: , Rfl:     atorvastatin (LIPITOR) 80 mg tablet, Take 1 tablet (80 mg total) by mouth daily, Disp: 90 tablet, Rfl: 1    Calcium 500 MG tablet, Take 500 mg by mouth, Disp: , Rfl:     carvedilol (COREG) 3.125 mg tablet, Take 1 tablet (3.125 mg total) by mouth 2 (two) times a day with meals, Disp: 60 tablet, Rfl: 8    Entresto 24-26 MG TABS, Take 1 tablet by mouth in the morning, Disp: 90 tablet, Rfl: 1    ezetimibe (ZETIA) 10 mg tablet, Take 1 tablet (10 mg total) by mouth daily, Disp: 90 tablet, Rfl: 1    Jardiance 10 MG TABS tablet, Take 1 tablet (10 mg total) by mouth daily, Disp: 90 tablet, Rfl: 1    multivitamin (THERAGRAN) TABS, Take 1 tablet by mouth daily, Disp: , Rfl:     ofloxacin (OCUFLOX)  "0.3 % ophthalmic solution, Administer 1 drop to both eyes 4 (four) times a day for 7 days, Disp: 10 mL, Rfl: 0    spironolactone (ALDACTONE) 25 mg tablet, Take 1 tablet (25 mg total) by mouth daily, Disp: 90 tablet, Rfl: 1    ticagrelor (Brilinta) 90 MG, Take 1 tablet (90 mg total) by mouth 2 (two) times a day, Disp: 180 tablet, Rfl: 1    famotidine (PEPCID) 40 MG tablet, TAKE 1 TABLET BY MOUTH DAILY AT BEDTIME, Disp: 90 tablet, Rfl: 1    pantoprazole (PROTONIX) 40 mg tablet, Take 1 tablet (40 mg total) by mouth daily, Disp: 90 tablet, Rfl: 1    Current Allergies     Allergies as of 04/10/2025 - Reviewed 04/10/2025   Allergen Reaction Noted    Strawberry extract - food allergy Hives 2014            The following portions of the patient's history were reviewed and updated as appropriate: allergies, current medications, past family history, past medical history, past social history, past surgical history and problem list.     Past Medical History:   Diagnosis Date    Allergic     seasonal    Asthma     very rarely    Colon polyp     GERD (gastroesophageal reflux disease) 10+ years ago    Hypertension     Myocardial infarction (HCC)     Obesity     Patient denies medical problems     PONV (postoperative nausea and vomiting)        Past Surgical History:   Procedure Laterality Date    ADENOIDECTOMY       SECTION      x1    COLONOSCOPY      CORONARY ANGIOPLASTY WITH STENT PLACEMENT      TONSILLECTOMY         Family History   Problem Relation Age of Onset    Breast cancer Mother 68    Hypertension Mother     Melanoma Mother 67    Coronary artery disease Mother     Arthritis Mother     Heart disease Father          at 70    Hyperlipidemia Father     Prostate cancer Father 65    Diabetes Father          Medications have been verified.        Objective   /89 (Patient Position: Sitting, Cuff Size: Standard)   Pulse 71   Temp 98.9 °F (37.2 °C) (Tympanic)   Resp 18   Ht 5' 5\" (1.651 m)   Wt 108 " kg (238 lb)   LMP 07/13/2022 (Approximate)   SpO2 97%   BMI 39.61 kg/m²   Patient's last menstrual period was 07/13/2022 (approximate).       Physical Exam     Physical Exam  Vitals and nursing note reviewed.   Constitutional:       General: She is not in acute distress.     Appearance: She is well-developed. She is not ill-appearing, toxic-appearing or diaphoretic.   HENT:      Head: Normocephalic and atraumatic.      Right Ear: Hearing, tympanic membrane, ear canal and external ear normal.      Left Ear: Hearing, ear canal and external ear normal. No decreased hearing noted. No drainage, swelling or tenderness.  No middle ear effusion. There is no impacted cerumen. No foreign body. No mastoid tenderness. Tympanic membrane is injected, erythematous and bulging. Tympanic membrane is not scarred, perforated or retracted.      Nose: Congestion present. No mucosal edema or rhinorrhea.      Right Sinus: No maxillary sinus tenderness or frontal sinus tenderness.      Left Sinus: No maxillary sinus tenderness or frontal sinus tenderness.      Mouth/Throat:      Lips: Pink.      Mouth: Mucous membranes are moist.      Pharynx: Uvula midline. No pharyngeal swelling, oropharyngeal exudate, posterior oropharyngeal erythema, uvula swelling or postnasal drip.      Tonsils: No tonsillar exudate or tonsillar abscesses. 1+ on the right. 1+ on the left.   Eyes:      General: Lids are normal. Vision grossly intact. Gaze aligned appropriately. No allergic shiner, visual field deficit or scleral icterus.        Right eye: Discharge present. No foreign body or hordeolum.         Left eye: Discharge present.No foreign body or hordeolum.      Extraocular Movements: Extraocular movements intact.      Right eye: Normal extraocular motion and no nystagmus.      Left eye: Normal extraocular motion and no nystagmus.      Conjunctiva/sclera:      Right eye: Right conjunctiva is injected. No chemosis, exudate or hemorrhage.     Left eye:  Left conjunctiva is injected. No chemosis, exudate or hemorrhage.  Cardiovascular:      Rate and Rhythm: Normal rate and regular rhythm.      Heart sounds: Normal heart sounds, S1 normal and S2 normal. Heart sounds not distant. No murmur heard.     No friction rub. No gallop. No S3 or S4 sounds.   Pulmonary:      Effort: Pulmonary effort is normal. No tachypnea, bradypnea, accessory muscle usage or respiratory distress.      Breath sounds: Normal breath sounds and air entry. No stridor, decreased air movement or transmitted upper airway sounds. No decreased breath sounds, wheezing, rhonchi or rales.   Musculoskeletal:      Cervical back: Normal range of motion and neck supple.   Lymphadenopathy:      Cervical: No cervical adenopathy.   Neurological:      Mental Status: She is alert and oriented to person, place, and time.   Psychiatric:         Behavior: Behavior normal.

## 2025-04-10 NOTE — PATIENT INSTRUCTIONS
L otitis media: Otitis media of the L ear will be treated with amoxicillin 875mg taken twice daily for ten days. Take with meals and a probiotic.   -Stay very well hydrated and push fluids, gatorade, pedialyte or electrolyte drinks can be beneficial. Staying hydrated is very important.   -Run a humidifier by your bed and take steamy showers to clear mucus   -Zicam nasal AllClear can be soothing to the nasal passages   -You can use flonase once daily for two weeks   -Advil or Tylenol for fever or pain.  -Diffusing aromatherapy oils, such as peppermint and eucalyptus can be soothing and help with congestion.   -Mucinex OTC or Zyrtec or Claritin. Drink plenty of water with the mucinex.   -Honey or throat lozenges for cough may be helpful  -Warm salt water gargles and tea with honey. Warm soups and teas can be soothing.   -Sleep with a few pillows propping you up at night to aid with coughing and congestion.   -Obtain a pulse ox device and check your O2 1-2 times a day. You want your oxygen levels to be >93%. If they go below 93% go to the ED immediately.   -Vitamin C 500mg, Vitamin D 2000IU daily. You can attempt to use zinc or Zicam up to 30mg per day.  Echinacea and elderberry may also aid with fighting viral infections.   -If your sx worsen or persist follow up with your PCP for recheck. Red flag signs and ED precautions discussed.     Acute Conjunctivitis:   -The patient's hx is consistent with possible bacterial conjunctivitis. Will prescribe Ofloxacin eye drops to be used as directed.   -Cool or warm compresses on the eyes for comfort   -Saline eye drops to flush discharge. Keep the drops in the fridge for a cooling effect.    -Avoid contact lenses until your symptoms improve  -Sunglasses can be worn for light sensitivity  -Frequent hand washing to avoid the spread  -Follow up with your Eye Doctor immediately if your sx worsen or persist

## 2025-05-20 ENCOUNTER — OFFICE VISIT (OUTPATIENT)
Dept: CARDIOLOGY CLINIC | Facility: CLINIC | Age: 55
End: 2025-05-20
Payer: COMMERCIAL

## 2025-05-20 VITALS
DIASTOLIC BLOOD PRESSURE: 70 MMHG | SYSTOLIC BLOOD PRESSURE: 110 MMHG | BODY MASS INDEX: 40.65 KG/M2 | OXYGEN SATURATION: 99 % | WEIGHT: 244 LBS | HEART RATE: 77 BPM | HEIGHT: 65 IN

## 2025-05-20 DIAGNOSIS — I25.10 CORONARY ARTERY DISEASE INVOLVING NATIVE CORONARY ARTERY OF NATIVE HEART WITHOUT ANGINA PECTORIS: Primary | ICD-10-CM

## 2025-05-20 DIAGNOSIS — E66.812 CLASS 2 OBESITY WITHOUT SERIOUS COMORBIDITY WITH BODY MASS INDEX (BMI) OF 38.0 TO 38.9 IN ADULT, UNSPECIFIED OBESITY TYPE: ICD-10-CM

## 2025-05-20 DIAGNOSIS — I50.22 CHRONIC SYSTOLIC CONGESTIVE HEART FAILURE (HCC): ICD-10-CM

## 2025-05-20 DIAGNOSIS — I10 PRIMARY HYPERTENSION: ICD-10-CM

## 2025-05-20 PROCEDURE — 99214 OFFICE O/P EST MOD 30 MIN: CPT | Performed by: INTERNAL MEDICINE

## 2025-05-20 RX ORDER — CARVEDILOL 3.12 MG/1
3.12 TABLET ORAL 2 TIMES DAILY WITH MEALS
Qty: 180 TABLET | Refills: 3 | Status: SHIPPED | OUTPATIENT
Start: 2025-05-20 | End: 2026-02-14

## 2025-05-20 NOTE — PROGRESS NOTES
Cardiology   Rolando Ding DO, [facility]  Scar Colvin MD, [facility]  Shant Miles MD, [facility]  Yolis Gandhi MD, [facility]  -------------------------------------------------------------------  [facility]  [address], [address], [address]  [place], [place], [address]  [phone]    Cardiology Follow Up  Sandrita Phillip  1970  [id number]          Assessment/Plan:    Assessment & Plan  Coronary artery disease involving native coronary artery of native heart without angina pectoris  - Patient without angina.  She had recovery of ejection fraction on her last echocardiogram.  -Continue aspirin and Brilinta.  Although stent was over a year ago, it was in LAD location at a bifurcation    - Risk factor reduction reviewed.   Chronic systolic congestive heart failure (HCC)  Wt Readings from Last 3 Encounters:   05/20/25 111 kg (244 lb)   04/10/25 108 kg (237 lb 9.6 oz)   04/10/25 108 kg (238 lb)     -Patient had a drop in ejection fraction at time of myocardial infarction.  It has recovered.  -Continue Entresto, Jardiance and carvedilol  -Echocardiogram ordered    Primary hypertension  - BP goal < 120/80  - Discussed diet and weight loss.     Class 2 obesity without serious comorbidity with body mass index (BMI) of 38.0 to 38.9 in adult, unspecified obesity type  - She will speak with PCP about GLP agonist      Interval History:     Sandrita Phillip is 55 y.o. female here for followup of CAD.     Since her last visit, she has been feeling well.  she denies any  chest pain, shortness of breath, LE edema, orthopnea or PND. 3 days ago, she was feeling fatigued and short of breath.  HR was elevated.    Diet is overall unchanged.  There has not been a significant change in weight.     Currently, she uses atorvastatin 80 mg daily and Zetia 10 mg daily.  For blood pressure, she uses carvedilol 3.125 mg twice daily and spironolactone 25 mg daily.  She also uses Entresto 24-26 mg twice daily.  For  "antiplatelets she is on Brilinta 90 mg twice daily and aspirin 81 mg daily.    Cardiac History:     In October 2023, she presented to [facility] with a non-ST segment elevation myocardial infarction.  Cardiac catheterization showed left main with no significant disease.  LAD had 95% stenosis and circumflex and RCA had no significant disease.  She underwent PCI with 3.5 x 15 mm Medtronic Christian drug-eluting stent with good result afterwards.  At the time, ejection fraction was reduced but subsequent echocardiogram at her previous cardiologist's office showed an improvement.  She had blood work done in April 2025 which showed total cholesterol 146 with LDL of 82 and LPA of 295. CRP was 10.8.      The following portions of the patient's history were reviewed and updated as appropriate: allergies, current medications, past family history, past medical history, past social history, past surgical history, and problem list.     Current Medications[1]        Review of Systems:  Review of Systems   Respiratory:  Negative for shortness of breath.    Cardiovascular:  Negative for chest pain, palpitations and leg swelling.   All other systems reviewed and are negative.        Physical Exam:  Vitals:  Vitals:    05/20/25 1656   BP: 110/70   BP Location: Left arm   Patient Position: Sitting   Cuff Size: Large   Pulse: 77   SpO2: 99%   Weight: 111 kg (244 lb)   Height: 5' 5\" (1.651 m)     Physical Exam   Constitutional: She appears healthy. No distress.   Eyes: Pupils are equal, round, and reactive to light. Conjunctivae are normal.   Neck: No JVD present.   Cardiovascular: Normal rate, regular rhythm and normal heart sounds. Exam reveals no gallop and no friction rub.   No murmur heard.  Pulmonary/Chest: Effort normal and breath sounds normal. She has no wheezes. She has no rales.   Musculoskeletal:         General: No tenderness, deformity or edema.      Cervical back: Normal range of motion and neck supple. "   Neurological: She is alert and oriented to person, place, and time.   Skin: Skin is warm and dry.        Cardiographics:  EKG: Personally reviewed   Last known EF: 55%    This note was completed in part utilizing [employer] Direct Software.  Grammatical errors, random word insertions, spelling mistakes, and incomplete sentences can be an occasional consequence of this system secondary to software limitations, ambient noise, and hardware issues.  If you have any questions or concerns about the content, text, or information contained within the body of this dictation, please contact the provider for clarification.           [1]   Current Outpatient Medications:     Aspir-Low 81 MG EC tablet, , Disp: , Rfl:     atorvastatin (LIPITOR) 80 mg tablet, Take 1 tablet (80 mg total) by mouth daily, Disp: 90 tablet, Rfl: 1    Calcium 500 MG tablet, Take 500 mg by mouth, Disp: , Rfl:     Entresto 24-26 MG TABS, Take 1 tablet by mouth in the morning, Disp: 90 tablet, Rfl: 1    ezetimibe (ZETIA) 10 mg tablet, Take 1 tablet (10 mg total) by mouth daily, Disp: 90 tablet, Rfl: 1    Jardiance 10 MG TABS tablet, Take 1 tablet (10 mg total) by mouth daily, Disp: 90 tablet, Rfl: 1    multivitamin (THERAGRAN) TABS, Take 1 tablet by mouth in the morning., Disp: , Rfl:     spironolactone (ALDACTONE) 25 mg tablet, Take 1 tablet (25 mg total) by mouth daily, Disp: 90 tablet, Rfl: 1    ticagrelor (Brilinta) 90 MG, Take 1 tablet (90 mg total) by mouth 2 (two) times a day, Disp: 180 tablet, Rfl: 1    carvedilol (COREG) 3.125 mg tablet, Take 1 tablet (3.125 mg total) by mouth 2 (two) times a day with meals, Disp: 60 tablet, Rfl: 8    pantoprazole (PROTONIX) 40 mg tablet, Take 1 tablet (40 mg total) by mouth daily, Disp: 90 tablet, Rfl: 1

## 2025-05-28 ENCOUNTER — TELEPHONE (OUTPATIENT)
Dept: CARDIOLOGY CLINIC | Facility: CLINIC | Age: 55
End: 2025-05-28

## 2025-05-28 NOTE — TELEPHONE ENCOUNTER
Patient has a new insurance Rx provider and they are looking for a fax number to fax something to the provider in order to help the patient get medications at a discounted rate.  Please call patient to discuss.

## 2025-05-29 DIAGNOSIS — I50.22 CHRONIC SYSTOLIC CONGESTIVE HEART FAILURE (HCC): ICD-10-CM

## 2025-05-29 DIAGNOSIS — I25.10 CORONARY ARTERY DISEASE INVOLVING NATIVE CORONARY ARTERY OF NATIVE HEART WITHOUT ANGINA PECTORIS: ICD-10-CM

## 2025-05-29 DIAGNOSIS — I10 HYPERTENSION, UNSPECIFIED TYPE: ICD-10-CM

## 2025-05-29 RX ORDER — SACUBITRIL AND VALSARTAN 24; 26 MG/1; MG/1
1 TABLET, FILM COATED ORAL DAILY
Qty: 90 TABLET | Refills: 0 | Status: SHIPPED | OUTPATIENT
Start: 2025-05-29

## 2025-05-29 RX ORDER — EMPAGLIFLOZIN 10 MG/1
10 TABLET, FILM COATED ORAL DAILY
Qty: 90 TABLET | Refills: 0 | Status: SHIPPED | OUTPATIENT
Start: 2025-05-29

## 2025-05-29 NOTE — TELEPHONE ENCOUNTER
Reason for call:   [x] Refill   [] Prior Auth  [x] Other: new pharmacy    Office:   [] PCP/Provider -   [x] Specialty/Provider -     Brilinta 90mg  1 tab bid  #180    Entresto 24-26mg  1 tab am #90    Jardiance 10mg  1 tab daily #90    Pharmacy: [facility] [place] [address] [address] [phone]     Mail Away Pharmacy   Does the patient have enough for 10 days?   [] Yes   [x] No - Send as HP to POD

## 2025-05-29 NOTE — TELEPHONE ENCOUNTER
Left voicemail for patient informing her that we did receive information about new pharmacy and I am awaiting Dr. Ding's return to the office on Monday to sign off on prescriptions to be sent to new pharmacy.

## 2025-05-29 NOTE — TELEPHONE ENCOUNTER
Caller: Sandrita (patient)    Doctor: Dr. Ding ([facility])    Reason for call: Sandrita called to check status of Rx. I relayed Dorothy's note (dated 5/29/25 @ 9:22am) with patient, and she was pleased. Patient requests an update on Monday after Dr. Ding signs off on the Rx as confirmation.     Call back#: [phone]

## 2025-06-02 RX ORDER — TICAGRELOR 90 MG/1
90 TABLET, FILM COATED ORAL 2 TIMES DAILY
Qty: 180 TABLET | Refills: 0 | Status: SHIPPED | OUTPATIENT
Start: 2025-06-02

## 2025-06-06 ENCOUNTER — RESULTS FOLLOW-UP (OUTPATIENT)
Dept: CARDIOLOGY CLINIC | Facility: CLINIC | Age: 55
End: 2025-06-06

## 2025-06-06 ENCOUNTER — HOSPITAL ENCOUNTER (OUTPATIENT)
Dept: NON INVASIVE DIAGNOSTICS | Facility: HOSPITAL | Age: 55
Discharge: HOME | End: 2025-06-06
Attending: INTERNAL MEDICINE
Payer: COMMERCIAL

## 2025-06-06 VITALS
HEART RATE: 77 BPM | SYSTOLIC BLOOD PRESSURE: 110 MMHG | HEIGHT: 65 IN | DIASTOLIC BLOOD PRESSURE: 70 MMHG | BODY MASS INDEX: 40.65 KG/M2 | WEIGHT: 244 LBS

## 2025-06-06 DIAGNOSIS — I50.22 CHRONIC SYSTOLIC CONGESTIVE HEART FAILURE (HCC): ICD-10-CM

## 2025-06-06 LAB
AORTIC ROOT: 3 CM
AV LVOT PEAK GRADIENT: 3 MMHG
AV PEAK GRADIENT: 11 MMHG
BSA FOR ECHO PROCEDURE: 2.15 M2
DOP CALC LVOT AREA: 2.54 CM2
DOP CALC LVOT DIAMETER: 1.8 CM
E WAVE DECELERATION TIME: 267 MS
E/A RATIO: 1.08
FRACTIONAL SHORTENING: 37 (ref 28–44)
INTERVENTRICULAR SEPTUM IN DIASTOLE (PARASTERNAL SHORT AXIS VIEW): 0.8 CM
INTERVENTRICULAR SEPTUM: 0.8 CM (ref 0.6–1.1)
LAAS-AP2: 22.8 CM2
LAAS-AP4: 18.6 CM2
LEFT ATRIUM SIZE: 2.7 CM
LEFT ATRIUM VOLUME (MOD BIPLANE): 63 ML
LEFT ATRIUM VOLUME INDEX (MOD BIPLANE): 29.3 ML/M2
LEFT INTERNAL DIMENSION IN SYSTOLE: 3.1 CM (ref 2.1–4)
LEFT VENTRICULAR INTERNAL DIMENSION IN DIASTOLE: 4.9 CM (ref 3.5–6)
LEFT VENTRICULAR POSTERIOR WALL IN END DIASTOLE: 1 CM
LEFT VENTRICULAR STROKE VOLUME: 75 ML
LV EF US.2D.A4C+ESTIMATED: 61 %
LVSV (TEICH): 75 ML
MV E'TISSUE VEL-SEP: 10 CM/S
MV PEAK A VEL: 0.53 M/S
MV PEAK E VEL: 57 CM/S
MV STENOSIS PRESSURE HALF TIME: 77 MS
MV VALVE AREA P 1/2 METHOD: 2.86
RIGHT ATRIUM AREA SYSTOLE A4C: 13.2 CM2
RIGHT VENTRICLE ID DIMENSION: 3.1 CM
SL CV LEFT ATRIUM LENGTH A2C: 5.4 CM
SL CV LV EF: 60
SL CV PED ECHO LEFT VENTRICLE DIASTOLIC VOLUME (MOD BIPLANE) 2D: 113 ML
SL CV PED ECHO LEFT VENTRICLE SYSTOLIC VOLUME (MOD BIPLANE) 2D: 39 ML
TRICUSPID ANNULAR PLANE SYSTOLIC EXCURSION: 2.5 CM

## 2025-06-06 PROCEDURE — 93306 TTE W/DOPPLER COMPLETE: CPT | Performed by: INTERNAL MEDICINE

## 2025-06-06 PROCEDURE — 93306 TTE W/DOPPLER COMPLETE: CPT

## 2025-06-13 DIAGNOSIS — K21.9 GASTROESOPHAGEAL REFLUX DISEASE, UNSPECIFIED WHETHER ESOPHAGITIS PRESENT: ICD-10-CM

## 2025-06-13 DIAGNOSIS — I50.22 CHRONIC SYSTOLIC CONGESTIVE HEART FAILURE (HCC): ICD-10-CM

## 2025-06-13 DIAGNOSIS — I10 HYPERTENSION, UNSPECIFIED TYPE: ICD-10-CM

## 2025-06-13 DIAGNOSIS — I25.10 CORONARY ARTERY DISEASE INVOLVING NATIVE CORONARY ARTERY OF NATIVE HEART WITHOUT ANGINA PECTORIS: ICD-10-CM

## 2025-06-13 NOTE — TELEPHONE ENCOUNTER
Reason for call:   [x] Refill   [] Prior Auth  [] Other:     Office:   [] PCP/Provider -   [x] Specialty/Provider - Gastroenterology [address]     Medication: pantoprazole (PROTONIX) 40 mg Take 1 tablet (40 mg total) by mouth daily       Pharmacy: [facility]    Local Pharmacy   Does the patient have enough for 3 days?   [x] Yes   [] No - Send as HP to POD    Mail Away Pharmacy   Does the patient have enough for 10 days?   [] Yes   [] No - Send as HP to POD

## 2025-06-16 RX ORDER — PANTOPRAZOLE SODIUM 40 MG/1
40 TABLET, DELAYED RELEASE ORAL DAILY
Qty: 90 TABLET | Refills: 1 | Status: SHIPPED | OUTPATIENT
Start: 2025-06-16 | End: 2025-12-13